# Patient Record
Sex: FEMALE | Race: WHITE | Employment: OTHER | URBAN - METROPOLITAN AREA
[De-identification: names, ages, dates, MRNs, and addresses within clinical notes are randomized per-mention and may not be internally consistent; named-entity substitution may affect disease eponyms.]

---

## 2021-03-03 DIAGNOSIS — Z23 NEED FOR VACCINATION: ICD-10-CM

## 2021-12-02 PROBLEM — S46.001A ROTATOR CUFF INJURY, RIGHT, INITIAL ENCOUNTER: Status: ACTIVE | Noted: 2021-12-02

## 2022-04-19 PROBLEM — E66.9 OBESITY, CLASS I, BMI 30-34.9: Status: ACTIVE | Noted: 2022-04-19

## 2022-04-19 PROBLEM — E66.811 OBESITY, CLASS I, BMI 30-34.9: Status: ACTIVE | Noted: 2022-04-19

## 2024-12-07 ENCOUNTER — APPOINTMENT (OUTPATIENT)
Dept: RADIOLOGY | Facility: MEDICAL CENTER | Age: 69
DRG: 565 | End: 2024-12-07
Attending: EMERGENCY MEDICINE
Payer: MEDICARE

## 2024-12-07 ENCOUNTER — HOSPITAL ENCOUNTER (INPATIENT)
Facility: MEDICAL CENTER | Age: 69
LOS: 6 days | DRG: 565 | End: 2024-12-13
Attending: EMERGENCY MEDICINE | Admitting: HOSPITALIST
Payer: MEDICARE

## 2024-12-07 DIAGNOSIS — R74.01 TRANSAMINITIS: ICD-10-CM

## 2024-12-07 DIAGNOSIS — E86.0 DEHYDRATION: ICD-10-CM

## 2024-12-07 DIAGNOSIS — S80.01XA CONTUSION OF RIGHT KNEE, INITIAL ENCOUNTER: ICD-10-CM

## 2024-12-07 DIAGNOSIS — I16.0 HYPERTENSIVE URGENCY: ICD-10-CM

## 2024-12-07 DIAGNOSIS — S22.42XA CLOSED FRACTURE OF MULTIPLE RIBS OF LEFT SIDE, INITIAL ENCOUNTER: ICD-10-CM

## 2024-12-07 DIAGNOSIS — T79.6XXA TRAUMATIC RHABDOMYOLYSIS, INITIAL ENCOUNTER (HCC): ICD-10-CM

## 2024-12-07 PROBLEM — R50.9 FEVER: Status: ACTIVE | Noted: 2024-12-07

## 2024-12-07 PROBLEM — Z71.89 ACP (ADVANCE CARE PLANNING): Status: ACTIVE | Noted: 2024-12-07

## 2024-12-07 PROBLEM — R74.8 LIVER ENZYME ELEVATION: Status: ACTIVE | Noted: 2024-12-07

## 2024-12-07 PROBLEM — N63.0 BREAST NODULE: Status: ACTIVE | Noted: 2024-12-07

## 2024-12-07 PROBLEM — M62.82 RHABDOMYOLYSIS: Status: ACTIVE | Noted: 2024-12-07

## 2024-12-07 PROBLEM — S22.42XD CLOSED FRACTURE OF MULTIPLE RIBS OF LEFT SIDE WITH ROUTINE HEALING: Status: ACTIVE | Noted: 2024-12-07

## 2024-12-07 PROBLEM — I25.10 CORONARY ARTERY DISEASE INVOLVING NATIVE CORONARY ARTERY OF NATIVE HEART WITHOUT ANGINA PECTORIS: Status: ACTIVE | Noted: 2024-12-07

## 2024-12-07 PROBLEM — E87.0 HYPERNATREMIA: Status: ACTIVE | Noted: 2024-12-07

## 2024-12-07 PROBLEM — R79.89 ELEVATED TROPONIN: Status: ACTIVE | Noted: 2024-12-07

## 2024-12-07 PROBLEM — I31.39 PERICARDIAL EFFUSION: Status: ACTIVE | Noted: 2024-12-07

## 2024-12-07 LAB
ALBUMIN SERPL BCP-MCNC: 4.1 G/DL (ref 3.2–4.9)
ALBUMIN/GLOB SERPL: 1.2 G/DL
ALP SERPL-CCNC: 95 U/L (ref 30–99)
ALT SERPL-CCNC: 302 U/L (ref 2–50)
ANION GAP SERPL CALC-SCNC: 16 MMOL/L (ref 7–16)
AST SERPL-CCNC: 875 U/L (ref 12–45)
BASOPHILS # BLD AUTO: 0.3 % (ref 0–1.8)
BASOPHILS # BLD: 0.04 K/UL (ref 0–0.12)
BILIRUB SERPL-MCNC: 0.9 MG/DL (ref 0.1–1.5)
BUN SERPL-MCNC: 41 MG/DL (ref 8–22)
CALCIUM ALBUM COR SERPL-MCNC: 9.4 MG/DL (ref 8.5–10.5)
CALCIUM SERPL-MCNC: 9.5 MG/DL (ref 8.4–10.2)
CHLORIDE SERPL-SCNC: 112 MMOL/L (ref 96–112)
CK SERPL-CCNC: ABNORMAL U/L (ref 0–154)
CO2 SERPL-SCNC: 20 MMOL/L (ref 20–33)
CREAT SERPL-MCNC: 0.88 MG/DL (ref 0.5–1.4)
EKG IMPRESSION: NORMAL
EOSINOPHIL # BLD AUTO: 0.02 K/UL (ref 0–0.51)
EOSINOPHIL NFR BLD: 0.2 % (ref 0–6.9)
ERYTHROCYTE [DISTWIDTH] IN BLOOD BY AUTOMATED COUNT: 47.9 FL (ref 35.9–50)
FLUAV RNA SPEC QL NAA+PROBE: NEGATIVE
FLUBV RNA SPEC QL NAA+PROBE: NEGATIVE
GFR SERPLBLD CREATININE-BSD FMLA CKD-EPI: 70 ML/MIN/1.73 M 2
GLOBULIN SER CALC-MCNC: 3.4 G/DL (ref 1.9–3.5)
GLUCOSE SERPL-MCNC: 105 MG/DL (ref 65–99)
HCT VFR BLD AUTO: 41.8 % (ref 37–47)
HGB BLD-MCNC: 13.2 G/DL (ref 12–16)
IMM GRANULOCYTES # BLD AUTO: 0.06 K/UL (ref 0–0.11)
IMM GRANULOCYTES NFR BLD AUTO: 0.5 % (ref 0–0.9)
LACTATE SERPL-SCNC: 1.3 MMOL/L (ref 0.5–2)
LIPASE SERPL-CCNC: 41 U/L (ref 11–82)
LYMPHOCYTES # BLD AUTO: 1.08 K/UL (ref 1–4.8)
LYMPHOCYTES NFR BLD: 8.8 % (ref 22–41)
MCH RBC QN AUTO: 25.6 PG (ref 27–33)
MCHC RBC AUTO-ENTMCNC: 31.6 G/DL (ref 32.2–35.5)
MCV RBC AUTO: 81 FL (ref 81.4–97.8)
MONOCYTES # BLD AUTO: 0.73 K/UL (ref 0–0.85)
MONOCYTES NFR BLD AUTO: 6 % (ref 0–13.4)
NEUTROPHILS # BLD AUTO: 10.3 K/UL (ref 1.82–7.42)
NEUTROPHILS NFR BLD: 84.2 % (ref 44–72)
NRBC # BLD AUTO: 0 K/UL
NRBC BLD-RTO: 0 /100 WBC (ref 0–0.2)
PLATELET # BLD AUTO: 388 K/UL (ref 164–446)
PMV BLD AUTO: 10.8 FL (ref 9–12.9)
POTASSIUM SERPL-SCNC: 4.3 MMOL/L (ref 3.6–5.5)
PROCALCITONIN SERPL-MCNC: 0.24 NG/ML
PROT SERPL-MCNC: 7.5 G/DL (ref 6–8.2)
RBC # BLD AUTO: 5.16 M/UL (ref 4.2–5.4)
RSV RNA SPEC QL NAA+PROBE: NEGATIVE
SARS-COV-2 RNA RESP QL NAA+PROBE: NOTDETECTED
SODIUM SERPL-SCNC: 148 MMOL/L (ref 135–145)
SPECIMEN SOURCE: NORMAL
TROPONIN T SERPL-MCNC: 61 NG/L (ref 6–19)
WBC # BLD AUTO: 12.2 K/UL (ref 4.8–10.8)

## 2024-12-07 PROCEDURE — 700117 HCHG RX CONTRAST REV CODE 255: Performed by: EMERGENCY MEDICINE

## 2024-12-07 PROCEDURE — 93005 ELECTROCARDIOGRAM TRACING: CPT | Mod: TC | Performed by: EMERGENCY MEDICINE

## 2024-12-07 PROCEDURE — 700105 HCHG RX REV CODE 258: Performed by: EMERGENCY MEDICINE

## 2024-12-07 PROCEDURE — 770020 HCHG ROOM/CARE - TELE (206)

## 2024-12-07 PROCEDURE — 87040 BLOOD CULTURE FOR BACTERIA: CPT

## 2024-12-07 PROCEDURE — 84484 ASSAY OF TROPONIN QUANT: CPT

## 2024-12-07 PROCEDURE — 83605 ASSAY OF LACTIC ACID: CPT

## 2024-12-07 PROCEDURE — 72125 CT NECK SPINE W/O DYE: CPT

## 2024-12-07 PROCEDURE — 84145 PROCALCITONIN (PCT): CPT

## 2024-12-07 PROCEDURE — 83690 ASSAY OF LIPASE: CPT

## 2024-12-07 PROCEDURE — 700105 HCHG RX REV CODE 258: Performed by: HOSPITALIST

## 2024-12-07 PROCEDURE — 71260 CT THORAX DX C+: CPT

## 2024-12-07 PROCEDURE — 700102 HCHG RX REV CODE 250 W/ 637 OVERRIDE(OP): Performed by: EMERGENCY MEDICINE

## 2024-12-07 PROCEDURE — 99497 ADVNCD CARE PLAN 30 MIN: CPT | Performed by: HOSPITALIST

## 2024-12-07 PROCEDURE — 73562 X-RAY EXAM OF KNEE 3: CPT | Mod: RT

## 2024-12-07 PROCEDURE — 71045 X-RAY EXAM CHEST 1 VIEW: CPT

## 2024-12-07 PROCEDURE — 0241U HCHG SARS-COV-2 COVID-19 NFCT DS RESP RNA 4 TRGT MIC: CPT

## 2024-12-07 PROCEDURE — 72128 CT CHEST SPINE W/O DYE: CPT

## 2024-12-07 PROCEDURE — 99285 EMERGENCY DEPT VISIT HI MDM: CPT

## 2024-12-07 PROCEDURE — 36415 COLL VENOUS BLD VENIPUNCTURE: CPT

## 2024-12-07 PROCEDURE — 85025 COMPLETE CBC W/AUTO DIFF WBC: CPT

## 2024-12-07 PROCEDURE — 72131 CT LUMBAR SPINE W/O DYE: CPT

## 2024-12-07 PROCEDURE — 99223 1ST HOSP IP/OBS HIGH 75: CPT | Mod: 25,AI | Performed by: HOSPITALIST

## 2024-12-07 PROCEDURE — 80053 COMPREHEN METABOLIC PANEL: CPT

## 2024-12-07 PROCEDURE — 70450 CT HEAD/BRAIN W/O DYE: CPT

## 2024-12-07 PROCEDURE — 82550 ASSAY OF CK (CPK): CPT

## 2024-12-07 PROCEDURE — A9270 NON-COVERED ITEM OR SERVICE: HCPCS | Performed by: EMERGENCY MEDICINE

## 2024-12-07 RX ORDER — ACETAMINOPHEN 325 MG/1
650 TABLET ORAL EVERY 6 HOURS PRN
Status: DISCONTINUED | OUTPATIENT
Start: 2024-12-07 | End: 2024-12-13 | Stop reason: HOSPADM

## 2024-12-07 RX ORDER — SODIUM CHLORIDE, SODIUM LACTATE, POTASSIUM CHLORIDE, CALCIUM CHLORIDE 600; 310; 30; 20 MG/100ML; MG/100ML; MG/100ML; MG/100ML
INJECTION, SOLUTION INTRAVENOUS CONTINUOUS
Status: ACTIVE | OUTPATIENT
Start: 2024-12-07 | End: 2024-12-08

## 2024-12-07 RX ORDER — SODIUM CHLORIDE, SODIUM LACTATE, POTASSIUM CHLORIDE, CALCIUM CHLORIDE 600; 310; 30; 20 MG/100ML; MG/100ML; MG/100ML; MG/100ML
INJECTION, SOLUTION INTRAVENOUS CONTINUOUS
Status: DISCONTINUED | OUTPATIENT
Start: 2024-12-08 | End: 2024-12-10

## 2024-12-07 RX ORDER — SODIUM CHLORIDE, SODIUM LACTATE, POTASSIUM CHLORIDE, CALCIUM CHLORIDE 600; 310; 30; 20 MG/100ML; MG/100ML; MG/100ML; MG/100ML
1000 INJECTION, SOLUTION INTRAVENOUS ONCE
Status: COMPLETED | OUTPATIENT
Start: 2024-12-07 | End: 2024-12-07

## 2024-12-07 RX ORDER — ACETAMINOPHEN 325 MG/1
650 TABLET ORAL ONCE
Status: COMPLETED | OUTPATIENT
Start: 2024-12-07 | End: 2024-12-07

## 2024-12-07 RX ORDER — SODIUM CHLORIDE 9 MG/ML
30 INJECTION, SOLUTION INTRAVENOUS
Status: DISCONTINUED | OUTPATIENT
Start: 2024-12-07 | End: 2024-12-13 | Stop reason: HOSPADM

## 2024-12-07 RX ORDER — HYDROMORPHONE HYDROCHLORIDE 1 MG/ML
0.25 INJECTION, SOLUTION INTRAMUSCULAR; INTRAVENOUS; SUBCUTANEOUS
Status: DISCONTINUED | OUTPATIENT
Start: 2024-12-07 | End: 2024-12-13 | Stop reason: HOSPADM

## 2024-12-07 RX ORDER — AMOXICILLIN 250 MG
2 CAPSULE ORAL EVERY EVENING
Status: DISCONTINUED | OUTPATIENT
Start: 2024-12-07 | End: 2024-12-13 | Stop reason: HOSPADM

## 2024-12-07 RX ORDER — ONDANSETRON 2 MG/ML
4 INJECTION INTRAMUSCULAR; INTRAVENOUS EVERY 4 HOURS PRN
Status: DISCONTINUED | OUTPATIENT
Start: 2024-12-07 | End: 2024-12-13 | Stop reason: HOSPADM

## 2024-12-07 RX ORDER — IBUPROFEN 400 MG/1
400 TABLET, FILM COATED ORAL 2 TIMES DAILY
Status: ON HOLD | COMMUNITY
Start: 2024-11-29 | End: 2024-12-13

## 2024-12-07 RX ORDER — ACETAMINOPHEN 325 MG/1
650 TABLET ORAL ONCE
Status: DISCONTINUED | OUTPATIENT
Start: 2024-12-07 | End: 2024-12-07 | Stop reason: CLARIF

## 2024-12-07 RX ORDER — OXYCODONE HYDROCHLORIDE 5 MG/1
2.5 TABLET ORAL
Status: DISCONTINUED | OUTPATIENT
Start: 2024-12-07 | End: 2024-12-13 | Stop reason: HOSPADM

## 2024-12-07 RX ORDER — ONDANSETRON 4 MG/1
4 TABLET, ORALLY DISINTEGRATING ORAL EVERY 4 HOURS PRN
Status: DISCONTINUED | OUTPATIENT
Start: 2024-12-07 | End: 2024-12-13 | Stop reason: HOSPADM

## 2024-12-07 RX ORDER — OXYCODONE HYDROCHLORIDE 5 MG/1
5 TABLET ORAL
Status: DISCONTINUED | OUTPATIENT
Start: 2024-12-07 | End: 2024-12-13 | Stop reason: HOSPADM

## 2024-12-07 RX ORDER — HYDRALAZINE HYDROCHLORIDE 20 MG/ML
10 INJECTION INTRAMUSCULAR; INTRAVENOUS EVERY 4 HOURS PRN
Status: DISCONTINUED | OUTPATIENT
Start: 2024-12-07 | End: 2024-12-11

## 2024-12-07 RX ORDER — POLYETHYLENE GLYCOL 3350 17 G/17G
1 POWDER, FOR SOLUTION ORAL
Status: DISCONTINUED | OUTPATIENT
Start: 2024-12-07 | End: 2024-12-13 | Stop reason: HOSPADM

## 2024-12-07 RX ORDER — ENOXAPARIN SODIUM 100 MG/ML
40 INJECTION SUBCUTANEOUS DAILY
Status: DISCONTINUED | OUTPATIENT
Start: 2024-12-08 | End: 2024-12-13 | Stop reason: HOSPADM

## 2024-12-07 RX ADMIN — SODIUM CHLORIDE, POTASSIUM CHLORIDE, SODIUM LACTATE AND CALCIUM CHLORIDE 2000 ML: 600; 310; 30; 20 INJECTION, SOLUTION INTRAVENOUS at 19:48

## 2024-12-07 RX ADMIN — IOHEXOL 100 ML: 350 INJECTION, SOLUTION INTRAVENOUS at 17:26

## 2024-12-07 RX ADMIN — SODIUM CHLORIDE, POTASSIUM CHLORIDE, SODIUM LACTATE AND CALCIUM CHLORIDE 1000 ML: 600; 310; 30; 20 INJECTION, SOLUTION INTRAVENOUS at 16:30

## 2024-12-07 RX ADMIN — ACETAMINOPHEN 650 MG: 325 TABLET ORAL at 17:48

## 2024-12-07 SDOH — ECONOMIC STABILITY: TRANSPORTATION INSECURITY
IN THE PAST 12 MONTHS, HAS THE LACK OF TRANSPORTATION KEPT YOU FROM MEDICAL APPOINTMENTS OR FROM GETTING MEDICATIONS?: NO

## 2024-12-07 SDOH — ECONOMIC STABILITY: TRANSPORTATION INSECURITY
IN THE PAST 12 MONTHS, HAS LACK OF RELIABLE TRANSPORTATION KEPT YOU FROM MEDICAL APPOINTMENTS, MEETINGS, WORK OR FROM GETTING THINGS NEEDED FOR DAILY LIVING?: NO

## 2024-12-07 ASSESSMENT — FIBROSIS 4 INDEX
FIB4 SCORE: 9.08
FIB4 SCORE: 9.08

## 2024-12-07 ASSESSMENT — ENCOUNTER SYMPTOMS
STRIDOR: 0
VOMITING: 0
EYE REDNESS: 0
EYE DISCHARGE: 0
BRUISES/BLEEDS EASILY: 0
FEVER: 0
BACK PAIN: 1
SHORTNESS OF BREATH: 0
COUGH: 0
NERVOUS/ANXIOUS: 0
MYALGIAS: 1
FOCAL WEAKNESS: 0
ABDOMINAL PAIN: 0
CHILLS: 0
FLANK PAIN: 0

## 2024-12-07 ASSESSMENT — LIFESTYLE VARIABLES
TOTAL SCORE: 0
DOES PATIENT WANT TO STOP DRINKING: NO
TOTAL SCORE: 0
ON A TYPICAL DAY WHEN YOU DRINK ALCOHOL HOW MANY DRINKS DO YOU HAVE: 1
ALCOHOL_USE: YES
TOTAL SCORE: 0
CONSUMPTION TOTAL: NEGATIVE
HOW MANY TIMES IN THE PAST YEAR HAVE YOU HAD 5 OR MORE DRINKS IN A DAY: 0
EVER HAD A DRINK FIRST THING IN THE MORNING TO STEADY YOUR NERVES TO GET RID OF A HANGOVER: NO
HAVE PEOPLE ANNOYED YOU BY CRITICIZING YOUR DRINKING: NO
HAVE YOU EVER FELT YOU SHOULD CUT DOWN ON YOUR DRINKING: NO
AVERAGE NUMBER OF DAYS PER WEEK YOU HAVE A DRINK CONTAINING ALCOHOL: 0
EVER FELT BAD OR GUILTY ABOUT YOUR DRINKING: NO

## 2024-12-07 ASSESSMENT — PAIN DESCRIPTION - PAIN TYPE: TYPE: ACUTE PAIN

## 2024-12-07 ASSESSMENT — COGNITIVE AND FUNCTIONAL STATUS - GENERAL
HELP NEEDED FOR BATHING: A LITTLE
CLIMB 3 TO 5 STEPS WITH RAILING: A LOT
DRESSING REGULAR LOWER BODY CLOTHING: A LOT
MOVING FROM LYING ON BACK TO SITTING ON SIDE OF FLAT BED: A LOT
SUGGESTED CMS G CODE MODIFIER MOBILITY: CL
MOVING TO AND FROM BED TO CHAIR: A LOT
SUGGESTED CMS G CODE MODIFIER DAILY ACTIVITY: CK
MOBILITY SCORE: 13
TOILETING: A LITTLE
DAILY ACTIVITIY SCORE: 19
DRESSING REGULAR UPPER BODY CLOTHING: A LITTLE
WALKING IN HOSPITAL ROOM: A LOT
STANDING UP FROM CHAIR USING ARMS: A LOT
TURNING FROM BACK TO SIDE WHILE IN FLAT BAD: A LITTLE

## 2024-12-07 ASSESSMENT — SOCIAL DETERMINANTS OF HEALTH (SDOH)
WITHIN THE LAST YEAR, HAVE YOU BEEN HUMILIATED OR EMOTIONALLY ABUSED IN OTHER WAYS BY YOUR PARTNER OR EX-PARTNER?: NO
WITHIN THE LAST YEAR, HAVE YOU BEEN KICKED, HIT, SLAPPED, OR OTHERWISE PHYSICALLY HURT BY YOUR PARTNER OR EX-PARTNER?: NO
WITHIN THE LAST YEAR, HAVE YOU BEEN AFRAID OF YOUR PARTNER OR EX-PARTNER?: NO
WITHIN THE PAST 12 MONTHS, THE FOOD YOU BOUGHT JUST DIDN'T LAST AND YOU DIDN'T HAVE MONEY TO GET MORE: NEVER TRUE
WITHIN THE LAST YEAR, HAVE TO BEEN RAPED OR FORCED TO HAVE ANY KIND OF SEXUAL ACTIVITY BY YOUR PARTNER OR EX-PARTNER?: NO
WITHIN THE PAST 12 MONTHS, YOU WORRIED THAT YOUR FOOD WOULD RUN OUT BEFORE YOU GOT THE MONEY TO BUY MORE: NEVER TRUE
IN THE PAST 12 MONTHS, HAS THE ELECTRIC, GAS, OIL, OR WATER COMPANY THREATENED TO SHUT OFF SERVICE IN YOUR HOME?: NO

## 2024-12-07 ASSESSMENT — PATIENT HEALTH QUESTIONNAIRE - PHQ9
1. LITTLE INTEREST OR PLEASURE IN DOING THINGS: NOT AT ALL
2. FEELING DOWN, DEPRESSED, IRRITABLE, OR HOPELESS: NOT AT ALL
SUM OF ALL RESPONSES TO PHQ9 QUESTIONS 1 AND 2: 0

## 2024-12-07 NOTE — DISCHARGE PLANNING
Success!  The APS Web Intake information was saved successfully.    Please keep this reference number for your records: [id number]    APS report file by this CM to APS based on what was relayed by RN per EMS.   Detail Level: Detailed Otc Regimen: Bug spray everytime outside Discontinue Regimen: Bactrim \\nAcyclovir Continue Regimen: Finish tapering dose of prednisone 10 mg tablet Take 4 tablets all together by mouth x 3 days, 3 tablets x 3 days, 2 tablets x 3 days, then 1 tablet x 3 days\\n\\nclobetasol 0.05 % topical ointment Apply a thin layer layer to irritation on body twice daily until clear, no longer than 4 weeks ( avoid face and groin) Modify Regimen: Xyzal 5 mg tablet Take 2 tablets by mouth at bedtime as needed for itching\\n\\nAllegra Allergy 180 mg tablet Take 2 tablets by mouth in the morning

## 2024-12-07 NOTE — ED TRIAGE NOTES
"Chief Complaint   Patient presents with    GLF     Pt states that something hit her on the back of her head and she fell down onto her right knee and was unable to get up, pt states this occurred on Wednesday   Per EMS report pts home is in unlivable condition this items everywhere making it difficult for them to get her out of home  Pt states the only pain she has at the moment is in her left rib cage as she felt a pop when she was attempting to get up yesterday       BP (!) 164/81   Pulse 86   Temp (!) 38.1 °C (100.6 °F) (Temporal)   Resp 20   Ht 1.6 m (5' 3\")   Wt 88.9 kg (196 lb)   SpO2 97%   BMI 34.72 kg/m²       Due to the condition of the pt and the report from EMS about the condition of the pts home case management was contacted.       "

## 2024-12-07 NOTE — ED NOTES
Pharmacy Medication Reconciliation      ~Medication reconciliation updated and complete per patient at bedside   ~Allergies have been verified and updated   ~No oral ABX within the last 30 days  ~Patient home pharmacy :  [facility]      ~Anticoagulants (rivaroxaban, apixaban, edoxaban, dabigatran, warfarin, enoxaparin) taken in the last 14 days? No

## 2024-12-07 NOTE — DISCHARGE PLANNING
note:  PAUL Nicholson notified CM that per EMS, pt's apartment is worse than a hoarder's place. APS needs to be filed by EMS because they have first hand information about pt's home condition. Natalia at [facility] notified and she sent a message to EMS personnel who picked up pt from home .     Discussed with CM sup and if pt is A/O x3 , we need to follow what pt wants to do. CM will complete assessment.

## 2024-12-07 NOTE — ED NOTES
Wounds     Right hand     Left Elbow    Left Knee     Right Knee     Left ankle     Left lower back     Sacrum       Noted bruises to right upper back and left ribs.

## 2024-12-07 NOTE — ED PROVIDER NOTES
"ED Provider Note    CHIEF COMPLAINT  Chief Complaint   Patient presents with    GLF     Pt states that something hit her on the back of her head and she fell down onto her right knee and was unable to get up, pt states this occurred on Wednesday   Per EMS report pts home is in unlivable condition this items everywhere making it difficult for them to get her out of home  Pt states the only pain she has at the moment is in her left rib cage as she felt a pop when she was attempting to get up yesterday         EXTERNAL RECORDS REVIEWED  Outpatient Notes patient was seen at the [facility] on September 23, 2022 for a rotator cuff tear.    HPI/ROS  LIMITATION TO HISTORY   Select: : None  OUTSIDE HISTORIAN(S):  EMS reported the patient's home is unlivable.    Luis Felipe Hernandez is a 69 y.o. female who presents to the ER with complaint of right knee pain and left rib pain after something fell on her head while she was trying to arrange something in her home 2 days ago.  The head strike caused her to fall to the ground although she did not get knocked out.  When she fell to the ground, the patient says she was unable to get up because she was \"in a tight space.\"  The paramedics report that the patient's home is unlivable.  She has items everywhere in the home making it very difficult for the paramedics to even extract her from the home.  The patient states that she feels a pop in her left rib cage when she attempts to move.  No trouble breathing.  Patient denies headache.  No nausea or vomiting.  She says she was unable to get up so she had to urinate on herself although she has not been eating or drinking for the last couple days because she has been unable to get up and therefore has not been urinating much over the last 24 hours or so.  No diarrhea.  She denies any recent cough, cold or flulike symptoms.  Patient does present with a low-grade fever here today but was unaware that she had a fever.  No pain with " "urination.  No abdominal pain.  Patient stays with respect to her knee pain, she is able to bend and extend her knee.  She says it was much more swollen 2 days ago after she fell.  No hip pain.  No neck pain or back pain.  Patient does not take any blood thinners.  She says she has a dog at home.  A friend is going to be going over to take care of her dogs/feed her dog.    PAST MEDICAL HISTORY       SURGICAL HISTORY   has a past surgical history that includes hysterectomy robotic (2019); other orthopedic surgery; shldr arthroscop,surg,w/rotat cuff repr (Right, 4/19/2022); shldr arthroscop,part acromioplas (Right, 4/19/2022); shldr arthroscop part debride 1-2 (Right, 4/19/2022); and repair biceps long tendon (Right, 4/19/2022).    FAMILY HISTORY  No family history on file.    SOCIAL HISTORY  Social History     Tobacco Use    Smoking status: Never    Smokeless tobacco: Never   Vaping Use    Vaping status: Not on file   Substance and Sexual Activity    Alcohol use: Not on file     Comment: occasional    Drug use: Not on file     Comment: CBD oil    Sexual activity: Not on file       CURRENT MEDICATIONS  Home Medications       Reviewed by Torsten Anand (Pharmacy Tech) on 12/07/24 at 1537  Med List Status: Complete     Medication Last Dose Status   ibuprofen (MOTRIN) 400 MG Tab 12/4/2024 Active   Non Formulary Request 12/4/2024 Active                  Audit from Redirected Encounters    **Home medications have not yet been reviewed for this encounter**         ALLERGIES  Allergies   Allergen Reactions    Amoxicillin Unspecified     Yeast infection       PHYSICAL EXAM  VITAL SIGNS: BP (!) 146/70   Pulse 69   Temp 36.8 °C (98.3 °F) (Oral)   Resp 18   Ht 1.6 m (5' 3\")   Wt 84.7 kg (186 lb 11.7 oz)   SpO2 94%   BMI 33.08 kg/m²    Constitutional:  Well developed, well nourished; No acute distress.  Disheveled.  Unkempt.  HENT: Normocephalic, Atraumatic, Bilateral external ears normal, very dry mucous " membranes.  Eyes: PERRL, EOMI, Conjunctiva normal, No discharge.   Neck: Normal range of motion, supple, nontender midline C-spine without step-off or deformity.   Lymphatic: No lymphadenopathy noted.   Cardiovascular: Normal heart rate, Normal rhythm, No murmurs, rubs or gallops   Thorax & Lungs: CTA=bilaterally;  No respiratory distress,  No wheezing rales, or rhonchi; there is some bruising over the left lower posterior lateral ribs with tenderness in that area.  No crepitus or subQ air  Abdomen: soft, good bowel sounds, no guarding no rebound, no masses, no pulsatile mass, no tenderness, no distention  Skin: Warm, Dry, No erythema, No rash.   Back: No tenderness to the midline T-spine or L-spine.  No step-off or deformity.  There is bruising over the right flank., No CVA tenderness.   Extremities: 2+ dp and pt pulses bilateral LEs;  Nontender; no pretibial edema.  Multiple areas of bruising and contusion, particularly over the medial aspects of the bilateral knees which appear to be pressure like contusions from lying on the ground for long period of time.  There is also a bruise over the dorsum of the right hand without any bony tenderness.  Full range of motion to the patient's hand.  neurologic: Alert & oriented x 4, clear speech,   Psychiatric: appropriate, normal affect     EKG/LABS  Results for orders placed or performed during the hospital encounter of 12/07/24   Lactic Acid    Collection Time: 12/07/24  3:06 PM   Result Value Ref Range    Lactic Acid 1.3 0.5 - 2.0 mmol/L   CBC with Differential    Collection Time: 12/07/24  3:06 PM   Result Value Ref Range    WBC 12.2 (H) 4.8 - 10.8 K/uL    RBC 5.16 4.20 - 5.40 M/uL    Hemoglobin 13.2 12.0 - 16.0 g/dL    Hematocrit 41.8 37.0 - 47.0 %    MCV 81.0 (L) 81.4 - 97.8 fL    MCH 25.6 (L) 27.0 - 33.0 pg    MCHC 31.6 (L) 32.2 - 35.5 g/dL    RDW 47.9 35.9 - 50.0 fL    Platelet Count 388 164 - 446 K/uL    MPV 10.8 9.0 - 12.9 fL    Neutrophils-Polys 84.20 (H) 44.00 -  72.00 %    Lymphocytes 8.80 (L) 22.00 - 41.00 %    Monocytes 6.00 0.00 - 13.40 %    Eosinophils 0.20 0.00 - 6.90 %    Basophils 0.30 0.00 - 1.80 %    Immature Granulocytes 0.50 0.00 - 0.90 %    Nucleated RBC 0.00 0.00 - 0.20 /100 WBC    Neutrophils (Absolute) 10.30 (H) 1.82 - 7.42 K/uL    Lymphs (Absolute) 1.08 1.00 - 4.80 K/uL    Monos (Absolute) 0.73 0.00 - 0.85 K/uL    Eos (Absolute) 0.02 0.00 - 0.51 K/uL    Baso (Absolute) 0.04 0.00 - 0.12 K/uL    Immature Granulocytes (abs) 0.06 0.00 - 0.11 K/uL    NRBC (Absolute) 0.00 K/uL   Complete Metabolic Panel    Collection Time: 12/07/24  3:06 PM   Result Value Ref Range    Sodium 148 (H) 135 - 145 mmol/L    Potassium 4.3 3.6 - 5.5 mmol/L    Chloride 112 96 - 112 mmol/L    Co2 20 20 - 33 mmol/L    Anion Gap 16.0 7.0 - 16.0    Glucose 105 (H) 65 - 99 mg/dL    Bun 41 (H) 8 - 22 mg/dL    Creatinine 0.88 0.50 - 1.40 mg/dL    Calcium 9.5 8.4 - 10.2 mg/dL    Correct Calcium 9.4 8.5 - 10.5 mg/dL    AST(SGOT) 875 (H) 12 - 45 U/L    ALT(SGPT) 302 (H) 2 - 50 U/L    Alkaline Phosphatase 95 30 - 99 U/L    Total Bilirubin 0.9 0.1 - 1.5 mg/dL    Albumin 4.1 3.2 - 4.9 g/dL    Total Protein 7.5 6.0 - 8.2 g/dL    Globulin 3.4 1.9 - 3.5 g/dL    A-G Ratio 1.2 g/dL   CREATINE KINASE    Collection Time: 12/07/24  3:06 PM   Result Value Ref Range    CPK Total >93369 (HH) 0 - 154 U/L   LIPASE    Collection Time: 12/07/24  3:06 PM   Result Value Ref Range    Lipase 41 11 - 82 U/L   TROPONIN    Collection Time: 12/07/24  3:06 PM   Result Value Ref Range    Troponin T 61 (H) 6 - 19 ng/L   ESTIMATED GFR    Collection Time: 12/07/24  3:06 PM   Result Value Ref Range    GFR (CKD-EPI) 70 >60 mL/min/1.73 m 2   PROCALCITONIN    Collection Time: 12/07/24  3:06 PM   Result Value Ref Range    Procalcitonin 0.24 <0.25 ng/mL   Blood Culture - Draw one from central line and one from peripheral site    Collection Time: 12/07/24  3:45 PM    Specimen: Peripheral; Blood   Result Value Ref Range    Significant  Indicator NEG     Source BLD     Site PERIPHERAL     Culture Result       No Growth  Note: Blood cultures are incubated for 5 days and  are monitored continuously.Positive blood cultures  are called to the RN and reported as soon as  they are identified.     CoV-2, FLU A/B, and RSV by PCR (2-4 Hours CEPHEID) : Collect NP swab in VTM    Collection Time: 24  3:45 PM    Specimen: Respirate   Result Value Ref Range    Influenza virus A RNA Negative Negative    Influenza virus B, PCR Negative Negative    RSV, PCR Negative Negative    SARS-CoV-2 by PCR NotDetected     SARS-CoV-2 Source Nasal Swab    EKG (NOW)    Collection Time: 24  3:51 PM   Result Value Ref Range    Report       [facility] Emergency [facility]    Test Date:  2024  Pt Name:    MARK FRAZIER                 Department: [facility]  MRN:        [id number]                      [facility]  Gender:     Female                       Technician: [id number]  :        1955                   Requested By:REBEKAH HERNANDEZ  Order #:    [id number]                    Reading MD: Rebekah Hernandez    Measurements  Intervals                                Axis  Rate:       77                           P:          21  CT:         147                          QRS:        30  QRSD:       75                           T:          5  QT:         385  QTc:        436    Interpretive Statements  Sinus rhythm rate 77  Normal axis  Normal intervals  No ST elevation or depression  No previous ECG available for comparison  Electronically Signed On 2024 18:14:34 PST by Rebekah Hernandez        I have independently interpreted this EKG:  Please see my EKG interpretation  above    RADIOLOGY/PROCEDURES   I have independently interpreted the diagnostic imaging associated with this visit and am waiting the final reading from the radiologist.     My preliminary interpretation is as follows: ER MD is reviewed the patient's chest x-ray.  No obvious  "infiltrate.    Radiologist interpretation:  DX-KNEE 3 VIEWS RIGHT   Final Result      No radiographic evidence of acute traumatic injury.      CT-TSPINE W/O PLUS RECONS   Final Result      Multilevel degenerative disease and DISH without a definite acute fracture.      CT-CSPINE WITHOUT PLUS RECONS   Final Result      No acute fracture or dislocation cervical spine.      Multilevel disc and facet degeneration and mild degenerative subluxations.      CT-CHEST,ABDOMEN,PELVIS WITH   Final Result         1. Fractures of the left posterior 10th and 11th ribs.   2. Small soft tissue nodule in the right superior breast.   3. Atherosclerosis including coronary artery disease.   4. Pericardial effusion.   5. Hiatal hernia.   6. Diverticulosis.      CT-LSPINE W/O PLUS RECONS   Final Result      Advanced multilevel degenerative disease without a definite acute fracture.      CT-HEAD W/O   Final Result      There is no definite acute intracranial abnormality.               DX-CHEST-PORTABLE (1 VIEW)   Final Result      No definite acute cardiac or pulmonary abnormalities are identified.      EC-ECHOCARDIOGRAM COMPLETE W/O CONT    (Results Pending)       COURSE & MEDICAL DECISION MAKING    ASSESSMENT, COURSE AND PLAN  Care Narrative: Patient presents to the ER by EMS after being on the ground for 2 days after a fall at her home.  EMS says her home was unlivable.  Apparently the house is so full of stuff that they were unable to even extract the patient from the house easily due to patient's hoarding behavior.  Patient says that she was \"rearranging things\" in her house when something fell and struck her in the head.  She is unsure what that thing was.  However, it caused her to fall to the ground.  No LOC.  When she got onto the ground she was unable to get up because she was in such a small space.  Initially she thought her knee was injured but reports that the swelling is actually gotten quite a bit better in the last day or " so.  She does not have any swelling to her right knee and she is able to flex and extend the right knee without any difficulty.  Her extensor mechanism is intact.  X-ray is negative.  No obvious fracture on film.  There is no shortening or rotation of her legs.  No pain with range of motion of either hip.  No concern for hip fracture.  She has bruising over the left ribs where she complains of rib pain.  There is  palpation tenderness in this area.  She also has some bruising over her right flank.  CT scan of the head, C-spine, T-spine, L-spine, chest/abdomen/pelvis were performed and patient was found to have rib fractures #10 and 11 on the left.  No pneumothorax.  CT scan also identified a small pericardial effusion.  Otherwise no other traumatic injuries.  Patient's laboratory evaluation reveals a total CK of over 22,000 suggestive of rhabdomyolysis.  Patient's renal function is normal.  No evidence of renal failure at this time.  She has a transaminitis.  I suspect this may be related to her rhabdomyolysis.  CT scan does not show any obvious liver pathology other than some fatty infiltration of the liver.  She has no tenderness in the right upper quadrant no complaint of abdominal pain.  Low suspicion for gallbladder pathology at this time.  Patient is extremely dry clinically.  She has dry mucous membranes.  She says she has not had anything to eat or drink in the last 2 days due to being on the floor.  Troponin came back a little bit high at 61.  EKG is nonacute.  There is no ST elevation or depression.  She has no chest pain.  At this time no concern for STEMI or non-STEMI.  Certainly trending her troponins would be beneficial is doing an echocardiogram since she was found to have a small pericardial effusion on her CT scan.  I spoke with Dr. Barros, trauma surgeon on-call down at [facility].  He is reviewed the patient's images.  Since the patient fell 2 days ago and  does not have any hypoxia or  pneumothorax, he feels patient can be managed out here at [facility] for her rib fractures.  She does not need transfer to the trauma service.  I spoke with the hospitalist on-call and he will kindly evaluate the patient hospitalization.    Hydration: Based on the patient's presentation of Other rhabdomyolysis the patient was given IV fluids. IV Hydration was used because oral hydration was not as rapid as required. Upon recheck following hydration, the patient was improved.      1850: Discussed with Dr. Carbajal, hospitalist on-call here at [facility].  He would like me to talk to trauma surgery at [facility] to be sure that they are fine keeping patient here at [facility].  Patient fell 2 days ago.  She is not hypoxic.  She is on room air.  She has 2 rib fractures without pneumothorax.  Will talk to Dr. Barros, trauma surgeon on-call at [facility] to see if he has any opposition to patient staying here at [facility] with her rib fractures.    1855: Paged trauma surgery    1930: discussed with Dr. Barros.  He has reviewed the patient's CT images.  He thinks patient is fine to stay here at [facility].  He recommends Tylenol and lidocaine patches for her rib fracture.    1940: Discussed with Dr. Carbajal, hospitalist on-call.  He will kindly evaluate the patient hospitalization.    ADDITIONAL PROBLEMS MANAGED  Problem #1: Right knee pain-improved  Problem #2: Left rib pain   Problem #3: Inability to get up off of the floor      DISPOSITION AND DISCUSSIONS  I have discussed management of the patient with the following physicians and IVANNA's:   Trauma surgeon and hospitalist    Discussion of management with other \[facility] [place] [facility]\"" or appropriate source(s): None     Escalation of care considered, and ultimately not performed: No complaint of hip pain.  No pain with range of motion of either hip.  No need for x-rays of the hips.    Barriers to care at this time, including but not limited to:  None known .      Decision tools and prescription drugs considered including, but not limited to: No evidence of any infection.  No need for antibiotics at this time.Antibiotics   .    FINAL DIAGNOSIS  1. Traumatic rhabdomyolysis, initial encounter ([facility]) Acute   2. Closed fracture of multiple ribs of left side, initial encounter Acute   3. Contusion of right knee, initial encounter Acute   4. Dehydration Acute   5. Transaminitis Acute        This dictation has been created using voice recognition software. The accuracy of the dictation is limited by the abilities of the software. I expect there may be some errors of grammar and possibly content. I made every attempt to manually correct the errors within my dictation. However, errors related to voice recognition software may still exist and should be interpreted within the appropriate context.     Electronically signed by: Sendy Hernandez M.D., 12/7/2024 3:10 PM

## 2024-12-07 NOTE — ED NOTES
"Pt provided with bed bath as she was covered in dirt despite being found down in her home. Pt reports that she fell in the [address] and that she knocked over three plans while \"rolling around\" in an attempt to get up. Pts pants were noted to have an odor of urine but no stool was present. Pts call light is in reach and brother is now bedside.   "

## 2024-12-08 ENCOUNTER — APPOINTMENT (OUTPATIENT)
Dept: CARDIOLOGY | Facility: MEDICAL CENTER | Age: 69
DRG: 565 | End: 2024-12-08
Attending: HOSPITALIST
Payer: MEDICARE

## 2024-12-08 LAB
ALBUMIN SERPL BCP-MCNC: 3.3 G/DL (ref 3.2–4.9)
ALBUMIN/GLOB SERPL: 1.1 G/DL
ALP SERPL-CCNC: 73 U/L (ref 30–99)
ALT SERPL-CCNC: 232 U/L (ref 2–50)
ANION GAP SERPL CALC-SCNC: 11 MMOL/L (ref 7–16)
AST SERPL-CCNC: 529 U/L (ref 12–45)
BILIRUB SERPL-MCNC: 0.5 MG/DL (ref 0.1–1.5)
BUN SERPL-MCNC: 40 MG/DL (ref 8–22)
CALCIUM ALBUM COR SERPL-MCNC: 9.2 MG/DL (ref 8.5–10.5)
CALCIUM SERPL-MCNC: 8.6 MG/DL (ref 8.4–10.2)
CHLORIDE SERPL-SCNC: 111 MMOL/L (ref 96–112)
CK SERPL-CCNC: ABNORMAL U/L (ref 0–154)
CO2 SERPL-SCNC: 21 MMOL/L (ref 20–33)
CREAT SERPL-MCNC: 0.91 MG/DL (ref 0.5–1.4)
ERYTHROCYTE [DISTWIDTH] IN BLOOD BY AUTOMATED COUNT: 48.4 FL (ref 35.9–50)
GFR SERPLBLD CREATININE-BSD FMLA CKD-EPI: 68 ML/MIN/1.73 M 2
GLOBULIN SER CALC-MCNC: 3 G/DL (ref 1.9–3.5)
GLUCOSE SERPL-MCNC: 125 MG/DL (ref 65–99)
HCT VFR BLD AUTO: 33.1 % (ref 37–47)
HGB BLD-MCNC: 10.5 G/DL (ref 12–16)
LACTATE SERPL-SCNC: 1 MMOL/L (ref 0.5–2)
LACTATE SERPL-SCNC: 1 MMOL/L (ref 0.5–2)
LV EJECT FRACT MOD 2C 99903: 55.5
LV EJECT FRACT MOD 4C 99902: 62.03
LV EJECT FRACT MOD BP 99901: 59.91
MAGNESIUM SERPL-MCNC: 2.2 MG/DL (ref 1.5–2.5)
MCH RBC QN AUTO: 25.7 PG (ref 27–33)
MCHC RBC AUTO-ENTMCNC: 31.7 G/DL (ref 32.2–35.5)
MCV RBC AUTO: 80.9 FL (ref 81.4–97.8)
PLATELET # BLD AUTO: 312 K/UL (ref 164–446)
PMV BLD AUTO: 11.2 FL (ref 9–12.9)
POTASSIUM SERPL-SCNC: 3.6 MMOL/L (ref 3.6–5.5)
PROT SERPL-MCNC: 6.3 G/DL (ref 6–8.2)
RBC # BLD AUTO: 4.09 M/UL (ref 4.2–5.4)
SODIUM SERPL-SCNC: 143 MMOL/L (ref 135–145)
WBC # BLD AUTO: 10.1 K/UL (ref 4.8–10.8)

## 2024-12-08 PROCEDURE — 87040 BLOOD CULTURE FOR BACTERIA: CPT

## 2024-12-08 PROCEDURE — 700105 HCHG RX REV CODE 258: Performed by: HOSPITALIST

## 2024-12-08 PROCEDURE — 770020 HCHG ROOM/CARE - TELE (206)

## 2024-12-08 PROCEDURE — 700105 HCHG RX REV CODE 258: Performed by: INTERNAL MEDICINE

## 2024-12-08 PROCEDURE — 36415 COLL VENOUS BLD VENIPUNCTURE: CPT

## 2024-12-08 PROCEDURE — A9270 NON-COVERED ITEM OR SERVICE: HCPCS | Performed by: HOSPITALIST

## 2024-12-08 PROCEDURE — 85027 COMPLETE CBC AUTOMATED: CPT

## 2024-12-08 PROCEDURE — 93306 TTE W/DOPPLER COMPLETE: CPT | Mod: 26 | Performed by: INTERNAL MEDICINE

## 2024-12-08 PROCEDURE — A9270 NON-COVERED ITEM OR SERVICE: HCPCS | Performed by: INTERNAL MEDICINE

## 2024-12-08 PROCEDURE — 99233 SBSQ HOSP IP/OBS HIGH 50: CPT | Performed by: INTERNAL MEDICINE

## 2024-12-08 PROCEDURE — 83735 ASSAY OF MAGNESIUM: CPT

## 2024-12-08 PROCEDURE — 83605 ASSAY OF LACTIC ACID: CPT

## 2024-12-08 PROCEDURE — 51798 US URINE CAPACITY MEASURE: CPT

## 2024-12-08 PROCEDURE — 700111 HCHG RX REV CODE 636 W/ 250 OVERRIDE (IP): Mod: JZ | Performed by: HOSPITALIST

## 2024-12-08 PROCEDURE — 93306 TTE W/DOPPLER COMPLETE: CPT

## 2024-12-08 PROCEDURE — 700102 HCHG RX REV CODE 250 W/ 637 OVERRIDE(OP): Performed by: HOSPITALIST

## 2024-12-08 PROCEDURE — 700102 HCHG RX REV CODE 250 W/ 637 OVERRIDE(OP): Performed by: INTERNAL MEDICINE

## 2024-12-08 PROCEDURE — 94760 N-INVAS EAR/PLS OXIMETRY 1: CPT

## 2024-12-08 PROCEDURE — 82550 ASSAY OF CK (CPK): CPT

## 2024-12-08 PROCEDURE — 80053 COMPREHEN METABOLIC PANEL: CPT

## 2024-12-08 RX ORDER — AMLODIPINE BESYLATE 5 MG/1
2.5 TABLET ORAL
Status: DISCONTINUED | OUTPATIENT
Start: 2024-12-08 | End: 2024-12-13 | Stop reason: HOSPADM

## 2024-12-08 RX ORDER — TRAMADOL HYDROCHLORIDE 50 MG/1
50 TABLET ORAL EVERY 6 HOURS PRN
Status: DISCONTINUED | OUTPATIENT
Start: 2024-12-08 | End: 2024-12-08

## 2024-12-08 RX ORDER — IBUPROFEN 400 MG/1
400 TABLET, FILM COATED ORAL EVERY 6 HOURS PRN
Status: DISCONTINUED | OUTPATIENT
Start: 2024-12-08 | End: 2024-12-13 | Stop reason: HOSPADM

## 2024-12-08 RX ORDER — TRAMADOL HYDROCHLORIDE 50 MG/1
50 TABLET ORAL EVERY 6 HOURS
Status: DISCONTINUED | OUTPATIENT
Start: 2024-12-08 | End: 2024-12-10

## 2024-12-08 RX ADMIN — IBUPROFEN 400 MG: 400 TABLET, FILM COATED ORAL at 20:26

## 2024-12-08 RX ADMIN — TRAMADOL HYDROCHLORIDE 50 MG: 50 TABLET ORAL at 11:31

## 2024-12-08 RX ADMIN — IBUPROFEN 400 MG: 400 TABLET, FILM COATED ORAL at 14:11

## 2024-12-08 RX ADMIN — SODIUM CHLORIDE, POTASSIUM CHLORIDE, SODIUM LACTATE AND CALCIUM CHLORIDE: 600; 310; 30; 20 INJECTION, SOLUTION INTRAVENOUS at 16:23

## 2024-12-08 RX ADMIN — ACETAMINOPHEN 650 MG: 325 TABLET ORAL at 00:49

## 2024-12-08 RX ADMIN — OMEPRAZOLE 20 MG: 20 CAPSULE, DELAYED RELEASE ORAL at 14:11

## 2024-12-08 RX ADMIN — SODIUM CHLORIDE, POTASSIUM CHLORIDE, SODIUM LACTATE AND CALCIUM CHLORIDE: 600; 310; 30; 20 INJECTION, SOLUTION INTRAVENOUS at 01:25

## 2024-12-08 RX ADMIN — ENOXAPARIN SODIUM 40 MG: 100 INJECTION SUBCUTANEOUS at 17:40

## 2024-12-08 RX ADMIN — AMLODIPINE BESYLATE 2.5 MG: 5 TABLET ORAL at 14:11

## 2024-12-08 ASSESSMENT — ENCOUNTER SYMPTOMS
PSYCHIATRIC NEGATIVE: 1
MYALGIAS: 1
NEUROLOGICAL NEGATIVE: 1
RESPIRATORY NEGATIVE: 1
EYES NEGATIVE: 1
GASTROINTESTINAL NEGATIVE: 1
CARDIOVASCULAR NEGATIVE: 1

## 2024-12-08 ASSESSMENT — SOCIAL DETERMINANTS OF HEALTH (SDOH)
WITHIN THE PAST 12 MONTHS, YOU WORRIED THAT YOUR FOOD WOULD RUN OUT BEFORE YOU GOT THE MONEY TO BUY MORE: NEVER TRUE
IN THE PAST 12 MONTHS, HAS THE ELECTRIC, GAS, OIL, OR WATER COMPANY THREATENED TO SHUT OFF SERVICE IN YOUR HOME?: NO
WITHIN THE PAST 12 MONTHS, THE FOOD YOU BOUGHT JUST DIDN'T LAST AND YOU DIDN'T HAVE MONEY TO GET MORE: NEVER TRUE

## 2024-12-08 ASSESSMENT — PAIN DESCRIPTION - PAIN TYPE
TYPE: ACUTE PAIN
TYPE: ACUTE PAIN;CHRONIC PAIN
TYPE: ACUTE PAIN

## 2024-12-08 NOTE — ASSESSMENT & PLAN NOTE
Pericardial effusion was seen on CT chest, abdomen, pelvis. Clinically stable.  Echocardiogram from 12/9/2024 shows trivial pericardial effusion. Monitor

## 2024-12-08 NOTE — PROGRESS NOTES
Hospital Medicine Daily Progress Note    Date of Service  12/8/2024    Chief Complaint  Luis Felipe Hernandez is a 69 y.o. female admitted 12/7/2024 after being down for about 3  days.  She states she was her hallway on 12/4/2024 trying to reach something and was hit in the back of her head by some object.  She fell on the floor and was unable to completely get up.  She denies losing consciousness but states she must of been confused.  Her brother came to her house on 12/6/2024, he did not come in as she was unable to open the door, and she was able to answer his questions through the front door.  Due to unable to reach the patient, the patient's brother called 911 on 12/7/2024.     Hospital Course  On admission, patient was febrile (101), blood pressure was elevated (209/75), WBCs were 12.2 K, lactic acid was normal x 3. CPK was > 60299. CT head and CXR were unremarkable. CT spine showed multilevel degenerative disc disease with no definite fractures. Right knee x-ray showed no evidence of acute traumatic injury.  CT chest, abdomen, pelvis showed fractures of the left posterior 10th and 11th ribs, and pericardial effusion.  Troponin was 61, EKG showed no acute ischemic findings.  Echocardiogram ordered.    COVID, influenza A&B, RSV PCR were negative. Procalcitonin was normal.  AST was 875, ALT was 302, alk phos and total bilirubin were normal.    Interval Problem Update  Afebrile, blood pressure is 160/70.  Le[place] [address]. Tramadol added for pain.  AST improved to 529, ALT improved to 232    I have discussed this patient's plan of care and discharge plan at IDT rounds today with Case Management, Nursing, Nursing leadership, and other members of the IDT team.    Consultants/Specialty  None    Code Status  Full Code    Disposition  The patient is not medically cleared for discharge to home or a post-acute facility.  Anticipate discharge to: home with close outpatient follow-up    I have placed the  appropriate orders for post-discharge needs.    Review of Systems  Review of Systems   Constitutional:  Positive for malaise/fatigue.   HENT: Negative.     Eyes: Negative.    Respiratory: Negative.     Cardiovascular: Negative.    Gastrointestinal: Negative.    Genitourinary: Negative.    Musculoskeletal:  Positive for joint pain and myalgias.   Skin: Negative.    Neurological: Negative.    Endo/Heme/Allergies: Negative.    Psychiatric/Behavioral: Negative.          Physical Exam  Temp:  [36.4 °C (97.6 °F)-38.2 °C (100.8 °F)] 37 °C (98.6 °F)  Pulse:  [65-88] 72  Resp:  [15-20] 18  BP: (121-209)/(60-81) 147/69  SpO2:  [93 %-98 %] 95 %    Physical Exam  Constitutional:       Appearance: She is ill-appearing.   HENT:      Head: Normocephalic.      Mouth/Throat:      Mouth: Mucous membranes are moist.   Cardiovascular:      Rate and Rhythm: Normal rate.   Pulmonary:      Effort: Pulmonary effort is normal.   Abdominal:      Palpations: Abdomen is soft.   Musculoskeletal:      Cervical back: Normal range of motion.      Right lower leg: No edema.      Left lower leg: No edema.   Skin:     General: Skin is warm.      Findings: Bruising present.   Neurological:      General: No focal deficit present.      Mental Status: She is alert.   Psychiatric:         Mood and Affect: Mood normal.         Fluids    Intake/Output Summary (Last 24 hours) at 12/8/2024 1230  Last data filed at 12/8/2024 0800  Gross per 24 hour   Intake 3444.74 ml   Output --   Net 3444.74 ml        Laboratory  Recent Labs     12/07/24  1506 12/08/24  0249   WBC 12.2* 10.1   RBC 5.16 4.09*   HEMOGLOBIN 13.2 10.5*   HEMATOCRIT 41.8 33.1*   MCV 81.0* 80.9*   MCH 25.6* 25.7*   MCHC 31.6* 31.7*   RDW 47.9 48.4   PLATELETCT 388 312   MPV 10.8 11.2     Recent Labs     12/07/24  1506 12/08/24  0249   SODIUM 148* 143   POTASSIUM 4.3 3.6   CHLORIDE 112 111   CO2 20 21   GLUCOSE 105* 125*   BUN 41* 40*   CREATININE 0.88 0.91   CALCIUM 9.5 8.6                    Imaging  EC-ECHOCARDIOGRAM COMPLETE W/O CONT   Final Result      DX-KNEE 3 VIEWS RIGHT   Final Result      No radiographic evidence of acute traumatic injury.      CT-TSPINE W/O PLUS RECONS   Final Result      Multilevel degenerative disease and DISH without a definite acute fracture.      CT-CSPINE WITHOUT PLUS RECONS   Final Result      No acute fracture or dislocation cervical spine.      Multilevel disc and facet degeneration and mild degenerative subluxations.      CT-CHEST,ABDOMEN,PELVIS WITH   Final Result         1. Fractures of the left posterior 10th and 11th ribs.   2. Small soft tissue nodule in the right superior breast.   3. Atherosclerosis including coronary artery disease.   4. Pericardial effusion.   5. Hiatal hernia.   6. Diverticulosis.      CT-LSPINE W/O PLUS RECONS   Final Result      Advanced multilevel degenerative disease without a definite acute fracture.      CT-HEAD W/O   Final Result      There is no definite acute intracranial abnormality.               DX-CHEST-PORTABLE (1 VIEW)   Final Result      No definite acute cardiac or pulmonary abnormalities are identified.           Assessment/Plan  * Traumatic rhabdomyolysis (HCC)- (present on admission)  Assessment & Plan  Patient was lying on the floor for almost 72 hours with very little movement.  Continue IV fluids.  Creatinine is normal.  CPK improved from > 57469 to 92826.  Monitor closely    Closed fracture of multiple ribs of left side with routine healing- (present on admission)  Assessment & Plan  CT chest, abdomen, pelvis showed fractures of the left posterior 10th and 11th ribs.  Continue multimodal pain management    Hypertensive urgency- (present on admission)  Assessment & Plan  Not on blood pressure medications at home, will start Norvasc with as needed hydralazine.    Pericardial effusion- (present on admission)  Assessment & Plan  Clinically stable.  Echocardiogram ordered.    Fever- (present on admission)  Assessment &  Plan  Likely reactive from rhabdomyolysis. COVID, influenza A&B, RSV PCR were negative.  Leukocytosis resolved. Procalcitonin was normal    Coronary artery disease involving native coronary artery of native heart without angina pectoris- (present on admission)  Assessment & Plan  Patient had extensive calcification noted on CT. troponin is mildly elevated, no ischemic changes seen on EKG.  Will monitor closely recommend outpatient follow-up    Liver enzyme elevation- (present on admission)  Assessment & Plan  Likely secondary to rhabdomyolysis.  On admission,  AST was 875, ALT was 302, alk phos and total bilirubin were normal. AST improved to 529, ALT improved to 232.  Monitor closely    Elevated troponin- (present on admission)  Assessment & Plan  EKG showed no acute ischemic findings.  Echocardiogram ordered.  Patient denied chest pain.  Monitor    Dehydration- (present on admission)  Assessment & Plan  Patient was lying on the floor for greater than 72 hours, she states she only had access to distilled water.  Continue hydration    Hypernatremia- (present on admission)  Assessment & Plan  Resolved.  Monitor    Breast nodule- (present on admission)  Assessment & Plan  CT showed small soft tissue nodule in the right superior breast.  Findings were discussed with the patient, patient was recommended to follow-up with outpatient PCP for further imaging and possible biopsy.    ACP (advance care planning)- (present on admission)  Assessment & Plan  Full code         VTE prophylaxis: Lovenox

## 2024-12-08 NOTE — DISCHARGE PLANNING
Met with pt who is A/Ox4. Pt said she lives alone and her brother lives around the corner from her. She has a best friend that lives in [place]. Pt said normally she is independent with ADLs and IADLs. She does not use any DMEs and no Home O2. She has no HH services.     We discussed discharge planning and the report by EMS that her house is cluttered and unsafe. Pt disagrees. Pt said her bedroom is fine. She agrees that it is a mess inside in the living room area but her bestfriend and her family are helping her declutter her house. Her end goal is to return home and she does not have any safety concerns.     Possible need to go to SNF due to RN stating that pt has trouble ambulating. Pt will need PT/OT eval. And follow their recommendations.     No PCP .   Pharmacy is [facility] on [address].   She said family can assist with transportation upon dc. Brother Reji # [phone].      Care Transition Team Assessment    Information Source  Orientation Level: Oriented X4  Information Given By: Patient  Who is responsible for making decisions for patient? : Patient    Readmission Evaluation  Is this a readmission?: No    Elopement Risk  Legal Hold: No  Ambulatory or Self Mobile in Wheelchair: No-Not an Elopement Risk    Interdisciplinary Discharge Planning  Does Admitting Nurse Feel This Could be a Complex Discharge?: No  Primary Care Physician: no PCP  Lives with - Patient's Self Care Capacity: Alone and Able to Care For Self  Support Systems: Family Member(s), Friends / Neighbors  Housing / Facility: [address]  Do You Take your Prescribed Medications Regularly: Yes  Able to Return to Previous ADL's: Future Time w/Therapy  Mobility Issues: No  Prior Services: Home-Independent  Patient Prefers to be Discharged to:: Home  Assistance Needed: No    Discharge Preparedness  What is your plan after discharge?: Skilled nursing facility  What are your discharge supports?: Sibling, Other (comment)  Prior Functional Level:  Ambulatory, Drives Self, Independent with Activities of Daily Living, Independent with Medication Management  Difficulity with ADLs: Walking, Toileting, Dressing, Bathing  Difficulity with IADLs: Cooking, Driving, Laundry, Shopping    Functional Assesment  Prior Functional Level: Ambulatory, Drives Self, Independent with Activities of Daily Living, Independent with Medication Management    Finances  Financial Barriers to Discharge: No  Prescription Coverage: Yes    Vision / Hearing Impairment  Vision Impairment : No  Hearing Impairment : No    Values / Beliefs / Concerns  Values / Beliefs Concerns : No    Advance Directive  Advance Directive?: None    Domestic Abuse  Have you ever been the victim of abuse or violence?: No         Discharge Risks or Barriers  Discharge risks or barriers?: Post-acute placement / services  Patient risk factors: Cognitive / sensory / physical deficit    Anticipated Discharge Information  Discharge Disposition: D/T to SNF with Medicare cert in anticipation of skilled care (03)

## 2024-12-08 NOTE — CARE PLAN
The patient is Stable - Low risk of patient condition declining or worsening    Shift Goals  Clinical Goals: pain <4/10, monitor labs, IV fluids  Patient Goals: Get better and go home  Family Goals: MISBAH    Progress made toward(s) clinical / shift goals:    Problem: Knowledge Deficit - Standard  Goal: Patient and family/care givers will demonstrate understanding of plan of care, disease process/condition, diagnostic tests and medications  Outcome: Progressing     Problem: Hemodynamics  Goal: Patient's hemodynamics, fluid balance and neurologic status will be stable or improve  Outcome: Progressing     Problem: Fluid Volume  Goal: Fluid volume balance will be maintained  Outcome: Progressing     Problem: Respiratory  Goal: Patient will achieve/maintain optimum respiratory ventilation and gas exchange  Outcome: Progressing     Problem: Fall Risk  Goal: Patient will remain free from falls  Outcome: Progressing       Patient is not progressing towards the following goals:

## 2024-12-08 NOTE — ASSESSMENT & PLAN NOTE
Not on blood pressure medications at home, will start Norvasc with as needed hydralazine.  Pressure has improved.  Monitor closely and adjust medications as needed

## 2024-12-08 NOTE — ASSESSMENT & PLAN NOTE
CT chest, abdomen, pelvis showed fractures of the left posterior 10th and 11th ribs.  Patient states that she can only tolerate ibuprofen for pain.  Omeprazole was added for stomach protection

## 2024-12-08 NOTE — PROGRESS NOTES
4 Eyes Skin Assessment Completed by PAUL Ferreira and PAUL Ko.    Head WDL  Ears WDL  Nose WDL  Mouth WDL  Neck WDL  Breast/Chest Redness under bilateral breasts  Shoulder Blades bruise left shoulder blade  Spine WDL  (R) Arm/Elbow/Hand Redness, Blanching, and Bruising  (L) Arm/Elbow/Hand Redness, Bruising, Abrasion, and Scab  Abdomen Bruising  Groin Redness  Scrotum/Coccyx/Buttocks Excoriation, skin tear left lower back  (R) Leg Scab, Bruising, and Abrasion  (L) Leg Scab, Bruising, and Abrasion  (R) Heel/Foot/Toe Redness and Blanching  (L) Heel/Foot/Toe Redness and Blanching, open sore bottom of foot          Devices In Places Tele Box      Interventions In Place Heel Mepilex, Sacral Mepilex, Pillows, and Heels Loaded W/Pillows    Possible Skin Injury Yes    Pictures Uploaded Into Epic Yes  Wound Consult Placed Yes  RN Wound Prevention Protocol Ordered Yes

## 2024-12-08 NOTE — ASSESSMENT & PLAN NOTE
Patient was lying on the floor for almost 72 hours with very little movement.  Continue IV fluids.  Creatinine is normal.  CPK improved from > 54696 to 7612.  Monitor closely    12/12: CPK down to 4100, discontinue IV fluids and monitor CPK if no increase in the next 24 hours okay to transition to skilled nursing

## 2024-12-08 NOTE — DISCHARGE PLANNING
note:  Discussed possible need for SNF or acute rehab. Pt said she prefer to go home. She said that she prefers home because she has a physical therapist and a chiroprator that she sees regular. Pt denies having difficulty ambulating. Explained that we will have PT/OT do an evaluation when she is admitted upstairs. Pt is agreeable.

## 2024-12-08 NOTE — ASSESSMENT & PLAN NOTE
Patient was lying on the floor for greater than 72 hours, she states she only had access to distilled water.  Continue hydration

## 2024-12-08 NOTE — H&P
Hospital Medicine History & Physical Note    Date of Service  12/7/2024    Primary Care Physician  Pcp Pt States None    Consultants  Emergency physician discussed with trauma on-call, Dr Barros     Code Status  Full Code    Chief Complaint  Chief Complaint   Patient presents with    GLF     Pt states that something hit her on the back of her head and she fell down onto her right knee and was unable to get up, pt states this occurred on Wednesday   Per EMS report pts home is in unlivable condition this items everywhere making it difficult for them to get her out of home  Pt states the only pain she has at the moment is in her left rib cage as she felt a pop when she was attempting to get up yesterday       History of Presenting Illness  Luis Felipe Hernandez is a 69 y.o. female with no significant past medical history other than class I obesity with a BMI of 34 who presented 12/7/2024 with multiple joint pains including right knee, right hand in addition to left-sided chest wall pain.  Symptoms occurred after she fell on the ground after presenting fell on her head while arranging goods in her house.  Patient patient did not passed out and was stuck in a tight space for 2 days without access to water or food.  She noticed that her urine output has decreased.  She does not have diarrhea, cough or headache.  In the emergency room she was found to have a low-grade fever with a temperature of 100.8     I discussed the plan of care with emergency physician, and the patient    Review of Systems  Review of Systems   Constitutional:  Positive for malaise/fatigue. Negative for chills and fever.   Eyes:  Negative for discharge and redness.   Respiratory:  Negative for cough, shortness of breath and stridor.    Cardiovascular:  Negative for leg swelling.        Chest wall pain   Gastrointestinal:  Negative for abdominal pain and vomiting.   Genitourinary:  Negative for flank pain.   Musculoskeletal:  Positive for back pain and  myalgias.   Skin: Negative.    Neurological:  Negative for focal weakness.   Endo/Heme/Allergies:  Does not bruise/bleed easily.   Psychiatric/Behavioral:  The patient is not nervous/anxious.      Past Medical History   has no past medical history on file.    Surgical History   has a past surgical history that includes hysterectomy robotic (2019); other orthopedic surgery; pr shldr arthroscop,surg,w/rotat cuff repr (Right, 4/19/2022); pr shldr arthroscop,part acromioplas (Right, 4/19/2022); pr shldr arthroscop part debride 1-2 (Right, 4/19/2022); and pr repair biceps long tendon (Right, 4/19/2022).     Family History  family history is not on file.      Social History   reports that she has never smoked. She has never used smokeless tobacco.    Allergies  Allergies   Allergen Reactions    Amoxicillin Unspecified     Yeast infection     Medications  Prior to Admission Medications   Prescriptions Last Dose Informant Patient Reported? Taking?   Non Formulary Request 12/4/2024 Patient Yes Yes   Sig: Apply 1 Application topically 2 times a day as needed (pain). Freedom Oil   ibuprofen (MOTRIN) 400 MG Tab 12/4/2024 Patient Yes Yes   Sig: Take 400 mg by mouth 2 times a day.      Facility-Administered Medications: None     Physical Exam  Temp:  [37.8 °C (100 °F)-38.2 °C (100.8 °F)] 37.8 °C (100 °F)  Pulse:  [71-88] 72  Resp:  [15-20] 20  BP: (130-209)/(60-81) 144/63  SpO2:  [93 %-97 %] 94 %  Blood Pressure : (!) 209/75   Temperature: 37.8 °C (100 °F)   Pulse: 72   Respiration: 15   Pulse Oximetry: 97 %     Physical Exam  Constitutional:       General: She is not in acute distress.  HENT:      Head: Normocephalic and atraumatic.      Right Ear: External ear normal.      Left Ear: External ear normal.      Nose: No congestion or rhinorrhea.      Mouth/Throat:      Mouth: Mucous membranes are moist.      Pharynx: No oropharyngeal exudate or posterior oropharyngeal erythema.   Eyes:      General: No scleral icterus.         "Right eye: No discharge.         Left eye: No discharge.      Conjunctiva/sclera: Conjunctivae normal.      Pupils: Pupils are equal, round, and reactive to light.   Cardiovascular:      Rate and Rhythm: Tachycardia present.      Heart sounds:      No friction rub. No gallop.   Pulmonary:      Effort: Pulmonary effort is normal.      Comments: Saturating well on room air.  Is able to speak in full sentences  Abdominal:      General: Abdomen is flat. There is no distension.      Tenderness: There is no guarding.   Musculoskeletal:         General: No swelling.      Cervical back: Neck supple. No rigidity. No muscular tenderness.      Right lower leg: No edema.      Left lower leg: No edema.   Skin:     Capillary Refill: Capillary refill takes 2 to 3 seconds.      Coloration: Skin is not jaundiced or pale.      Findings: No bruising or erythema.      Comments: Bruising around both knees, right hip, & right hand   Neurological:      Mental Status: She is alert and oriented to person, place, and time.   Psychiatric:         Mood and Affect: Mood normal.         Judgment: Judgment normal.       Laboratory:  Recent Labs     12/07/24  1506   WBC 12.2*   RBC 5.16   HEMOGLOBIN 13.2   HEMATOCRIT 41.8   MCV 81.0*   MCH 25.6*   MCHC 31.6*   RDW 47.9   PLATELETCT 388   MPV 10.8     Recent Labs     12/07/24  1506   SODIUM 148*   POTASSIUM 4.3   CHLORIDE 112   CO2 20   GLUCOSE 105*   BUN 41*   CREATININE 0.88   CALCIUM 9.5     Recent Labs     12/07/24  1506   ALTSGPT 302*   ASTSGOT 875*   ALKPHOSPHAT 95   TBILIRUBIN 0.9   LIPASE 41   GLUCOSE 105*         No results for input(s): \"NTPROBNP\" in the last 72 hours.      Recent Labs     12/07/24  1506   TROPONINT 61*     Imaging:  DX-KNEE 3 VIEWS RIGHT   Final Result      No radiographic evidence of acute traumatic injury.      CT-TSPINE W/O PLUS RECONS   Final Result      Multilevel degenerative disease and DISH without a definite acute fracture.      CT-CSPINE WITHOUT PLUS RECONS "   Final Result      No acute fracture or dislocation cervical spine.      Multilevel disc and facet degeneration and mild degenerative subluxations.      CT-CHEST,ABDOMEN,PELVIS WITH   Final Result         1. Fractures of the left posterior 10th and 11th ribs.   2. Small soft tissue nodule in the right superior breast.   3. Atherosclerosis including coronary artery disease.   4. Pericardial effusion.   5. Hiatal hernia.   6. Diverticulosis.      CT-LSPINE W/O PLUS RECONS   Final Result      Advanced multilevel degenerative disease without a definite acute fracture.      CT-HEAD W/O   Final Result      There is no definite acute intracranial abnormality.               DX-CHEST-PORTABLE (1 VIEW)   Final Result      No definite acute cardiac or pulmonary abnormalities are identified.      EC-ECHOCARDIOGRAM COMPLETE W/O CONT    (Results Pending)     I personally reviewed patient EKG shows sinus rhythm with a rate of 77.  There is no ST elevation.  There is 1 mm ST depressions in lead I, 2.  There is a T wave inversion in lead III.  QTc is 436    Assessment/Plan:  Justification for Admission Status  I anticipate this patient will require at least two midnights for appropriate medical management, necessitating inpatient admission because patient has traumatic rhabdomyolysis, will require intravenous fluids, close monitoring of renal function, and urine output    Patient will need a Telemetry bed on MEDICAL service.      * Traumatic rhabdomyolysis (HCC)- (present on admission)  Assessment & Plan  Was on the floor for 2 days.  CPK > 22K   I will start intravenous fluids.  Monitor renal function and urine output.  I will order follow-up BUN and creatinine    Closed fracture of multiple ribs of left side with routine healing- (present on admission)  Assessment & Plan  Imaging show evidence for fractures of the left posterior 10th and 11th ribs.   Emergency physician discussed patient condition with trauma on-call Dr Barros,  acute distress of manage.  Multimodal pain control.  Continuous pulse oximetry.  Intravenous fluids for traumatic rhabdomyolysis     Hypertensive urgency- (present on admission)  Assessment & Plan  I will start hydralazine as needed for extreme hypertension    Breast nodule- (present on admission)  Assessment & Plan  CT imaging show evidence for a small soft tissue nodule in the right superior breast.   Finding discussed with the patient.  Recommend follow-up with outpatient/consider biopsy according to goals of care     Coronary artery disease involving native coronary artery of native heart without angina pectoris- (present on admission)  Assessment & Plan  Extensive calcification are seen on CT scan  Not currently having chest pain.  I will monitor on telemetry.  Recommend stress test as outpatient, earlier if becomes symptomatic    Liver enzyme elevation- (present on admission)  Assessment & Plan  AST and ALT elevation are likely secondary to rhabdomyolysis  I will order follow-up AST and ALT    Elevated troponin- (present on admission)  Assessment & Plan  In the indeterminate range  Denies chest pain.  EKG shows sinus rhythm with a rate of 77.  There is no ST elevation.  There is 1 mm ST depressions in lead I, 2.  There is a T wave inversion in lead III.  QTc is 436   I will place on continuous cardiac monitoring    I will trend troponin levels  Stat EKG, troponin for chest pain or shortness of breath   I will check an echocardiography     Pericardial effusion- (present on admission)  Assessment & Plan  I will check an echocardiography to evaluate for tamponade physiology    Fever- (present on admission)  Assessment & Plan  Rule out COVID-19, RSV and influenza.  I will check a procalcitonin     Dehydration- (present on admission)  Assessment & Plan  Likely secondary to reduced oral intake   Encourage oral intake as tolerated, antiemetics as needed.  Intravenous hydration until adequate oral intake is achieved.      Hypernatremia- (present on admission)  Assessment & Plan  Hypovolemic hypernatremia   I expect Na to improve with IVF     ACP (advance care planning)- (present on admission)  Assessment & Plan  I had a discussion with the patient regarding goals of care, diagnoses, prognosis, and CODE STATUS. We discussed her prognosis and comorbidities.  The patient has advanced age of 69 years.  She has relatively few comorbid conditions given her age.  She is presenting with traumatic rhabdomyolysis and severe dehydration.  At this point the patient wants to maintain a full code.  She is open to all forms of invasive or noninvasive diagnostic and therapeutic interventions.       VTE prophylaxis: SCDs/TEDs and enoxaparin ppx    I had a discussion with the patient regarding goals of care, diagnoses, prognosis, and CODE STATUS. We discussed her prognosis and comorbidities.  The patient has advanced age of 69 years.  She has relatively few comorbid conditions given her age.  She is presenting with traumatic rhabdomyolysis and severe dehydration.  At this point the patient wants to maintain a full code.  She is open to all forms of invasive or noninvasive diagnostic and therapeutic interventions. I spent 17 minutes on advanced care planning.

## 2024-12-08 NOTE — ED NOTES
Lab called with critical result of CPK 22,000 at 1600. Critical lab result read back to lab.   Dr. Hernandez notified of critical lab result at 1600.  Critical lab result read back by Dr. Hernandez.

## 2024-12-08 NOTE — ASSESSMENT & PLAN NOTE
CT showed small soft tissue nodule in the right superior breast.  Findings were discussed with the patient, patient was recommended to follow-up with outpatient PCP for further imaging and possible biopsy.

## 2024-12-08 NOTE — PROGRESS NOTES
12-hour chart check complete.    Monitor Summary  Rhythm: SR/SB  Rate: 58-73  Ectopy: r PVC/PAC  Measurements: 0.16/0.04/0.38

## 2024-12-08 NOTE — ASSESSMENT & PLAN NOTE
EKG showed no acute ischemic findings.  Echocardiogram ordered.  Patient denied chest pain.  Monitor

## 2024-12-08 NOTE — PROGRESS NOTES
Mindy from Lab called with critical result of cpk 12,592 at 0340. Critical lab result read back to Mindy. Dr. Curiel notified of critical lab result at 0342 . Critical lab result read back by Dr. Curiel     No new orders.

## 2024-12-08 NOTE — ASSESSMENT & PLAN NOTE
Likely reactive from rhabdomyolysis. COVID, influenza A&B, RSV PCR were negative.  Leukocytosis resolved. Procalcitonin was normal

## 2024-12-08 NOTE — ASSESSMENT & PLAN NOTE
Patient had extensive calcification noted on CT. Troponin is mildly elevated, no ischemic changes seen on EKG. Echocardiogram showed LVEF of about 60% with grade 2 diastolic dysfunction. Recommend outpatient follow-up

## 2024-12-09 LAB
ALBUMIN SERPL BCP-MCNC: 3.1 G/DL (ref 3.2–4.9)
ALBUMIN/GLOB SERPL: 1.1 G/DL
ALP SERPL-CCNC: 74 U/L (ref 30–99)
ALT SERPL-CCNC: 201 U/L (ref 2–50)
ANION GAP SERPL CALC-SCNC: 10 MMOL/L (ref 7–16)
AST SERPL-CCNC: 442 U/L (ref 12–45)
BILIRUB SERPL-MCNC: 0.3 MG/DL (ref 0.1–1.5)
BUN SERPL-MCNC: 36 MG/DL (ref 8–22)
CALCIUM ALBUM COR SERPL-MCNC: 9.1 MG/DL (ref 8.5–10.5)
CALCIUM SERPL-MCNC: 8.4 MG/DL (ref 8.4–10.2)
CHLORIDE SERPL-SCNC: 109 MMOL/L (ref 96–112)
CK SERPL-CCNC: ABNORMAL U/L (ref 0–154)
CO2 SERPL-SCNC: 22 MMOL/L (ref 20–33)
CREAT SERPL-MCNC: 1.01 MG/DL (ref 0.5–1.4)
ERYTHROCYTE [DISTWIDTH] IN BLOOD BY AUTOMATED COUNT: 49.1 FL (ref 35.9–50)
GFR SERPLBLD CREATININE-BSD FMLA CKD-EPI: 60 ML/MIN/1.73 M 2
GLOBULIN SER CALC-MCNC: 2.8 G/DL (ref 1.9–3.5)
GLUCOSE SERPL-MCNC: 109 MG/DL (ref 65–99)
HCT VFR BLD AUTO: 32.6 % (ref 37–47)
HGB BLD-MCNC: 10.3 G/DL (ref 12–16)
MCH RBC QN AUTO: 25.9 PG (ref 27–33)
MCHC RBC AUTO-ENTMCNC: 31.6 G/DL (ref 32.2–35.5)
MCV RBC AUTO: 81.9 FL (ref 81.4–97.8)
PLATELET # BLD AUTO: 271 K/UL (ref 164–446)
PMV BLD AUTO: 11.4 FL (ref 9–12.9)
POTASSIUM SERPL-SCNC: 3.6 MMOL/L (ref 3.6–5.5)
PROT SERPL-MCNC: 5.9 G/DL (ref 6–8.2)
RBC # BLD AUTO: 3.98 M/UL (ref 4.2–5.4)
SODIUM SERPL-SCNC: 141 MMOL/L (ref 135–145)
WBC # BLD AUTO: 8.3 K/UL (ref 4.8–10.8)

## 2024-12-09 PROCEDURE — 36415 COLL VENOUS BLD VENIPUNCTURE: CPT

## 2024-12-09 PROCEDURE — 97602 WOUND(S) CARE NON-SELECTIVE: CPT

## 2024-12-09 PROCEDURE — 94760 N-INVAS EAR/PLS OXIMETRY 1: CPT

## 2024-12-09 PROCEDURE — 82550 ASSAY OF CK (CPK): CPT

## 2024-12-09 PROCEDURE — A9270 NON-COVERED ITEM OR SERVICE: HCPCS | Performed by: INTERNAL MEDICINE

## 2024-12-09 PROCEDURE — 85027 COMPLETE CBC AUTOMATED: CPT

## 2024-12-09 PROCEDURE — 700105 HCHG RX REV CODE 258: Performed by: INTERNAL MEDICINE

## 2024-12-09 PROCEDURE — 97166 OT EVAL MOD COMPLEX 45 MIN: CPT

## 2024-12-09 PROCEDURE — 770020 HCHG ROOM/CARE - TELE (206)

## 2024-12-09 PROCEDURE — 99233 SBSQ HOSP IP/OBS HIGH 50: CPT | Performed by: INTERNAL MEDICINE

## 2024-12-09 PROCEDURE — 700102 HCHG RX REV CODE 250 W/ 637 OVERRIDE(OP): Performed by: INTERNAL MEDICINE

## 2024-12-09 PROCEDURE — 97162 PT EVAL MOD COMPLEX 30 MIN: CPT

## 2024-12-09 PROCEDURE — 80053 COMPREHEN METABOLIC PANEL: CPT

## 2024-12-09 PROCEDURE — 700111 HCHG RX REV CODE 636 W/ 250 OVERRIDE (IP): Mod: JZ | Performed by: HOSPITALIST

## 2024-12-09 RX ADMIN — SODIUM CHLORIDE, POTASSIUM CHLORIDE, SODIUM LACTATE AND CALCIUM CHLORIDE: 600; 310; 30; 20 INJECTION, SOLUTION INTRAVENOUS at 00:29

## 2024-12-09 RX ADMIN — AMLODIPINE BESYLATE 2.5 MG: 5 TABLET ORAL at 04:52

## 2024-12-09 RX ADMIN — SODIUM CHLORIDE, POTASSIUM CHLORIDE, SODIUM LACTATE AND CALCIUM CHLORIDE: 600; 310; 30; 20 INJECTION, SOLUTION INTRAVENOUS at 09:16

## 2024-12-09 RX ADMIN — IBUPROFEN 400 MG: 400 TABLET, FILM COATED ORAL at 04:09

## 2024-12-09 RX ADMIN — IBUPROFEN 400 MG: 400 TABLET, FILM COATED ORAL at 17:22

## 2024-12-09 RX ADMIN — OMEPRAZOLE 20 MG: 20 CAPSULE, DELAYED RELEASE ORAL at 04:52

## 2024-12-09 RX ADMIN — ENOXAPARIN SODIUM 40 MG: 100 INJECTION SUBCUTANEOUS at 17:22

## 2024-12-09 RX ADMIN — IBUPROFEN 400 MG: 400 TABLET, FILM COATED ORAL at 10:00

## 2024-12-09 RX ADMIN — SODIUM CHLORIDE, POTASSIUM CHLORIDE, SODIUM LACTATE AND CALCIUM CHLORIDE: 600; 310; 30; 20 INJECTION, SOLUTION INTRAVENOUS at 17:24

## 2024-12-09 ASSESSMENT — COGNITIVE AND FUNCTIONAL STATUS - GENERAL
EATING MEALS: A LITTLE
SUGGESTED CMS G CODE MODIFIER DAILY ACTIVITY: CK
MOBILITY SCORE: 10
MOVING TO AND FROM BED TO CHAIR: A LOT
CLIMB 3 TO 5 STEPS WITH RAILING: TOTAL
HELP NEEDED FOR BATHING: A LOT
DAILY ACTIVITIY SCORE: 15
DRESSING REGULAR LOWER BODY CLOTHING: A LOT
TURNING FROM BACK TO SIDE WHILE IN FLAT BAD: A LOT
DRESSING REGULAR UPPER BODY CLOTHING: A LITTLE
STANDING UP FROM CHAIR USING ARMS: A LOT
MOVING FROM LYING ON BACK TO SITTING ON SIDE OF FLAT BED: A LOT
TOILETING: A LOT
WALKING IN HOSPITAL ROOM: TOTAL
PERSONAL GROOMING: A LITTLE
SUGGESTED CMS G CODE MODIFIER MOBILITY: CL

## 2024-12-09 ASSESSMENT — ENCOUNTER SYMPTOMS
PSYCHIATRIC NEGATIVE: 1
MYALGIAS: 1
NEUROLOGICAL NEGATIVE: 1
CARDIOVASCULAR NEGATIVE: 1
RESPIRATORY NEGATIVE: 1
GASTROINTESTINAL NEGATIVE: 1
EYES NEGATIVE: 1

## 2024-12-09 ASSESSMENT — GAIT ASSESSMENTS: GAIT LEVEL OF ASSIST: UNABLE TO PARTICIPATE

## 2024-12-09 ASSESSMENT — ACTIVITIES OF DAILY LIVING (ADL): TOILETING: INDEPENDENT

## 2024-12-09 ASSESSMENT — PAIN DESCRIPTION - PAIN TYPE
TYPE: ACUTE PAIN
TYPE: ACUTE PAIN

## 2024-12-09 NOTE — CARE PLAN
The patient is Stable - Low risk of patient condition declining or worsening    Shift Goals  Clinical Goals: Keep pain below 2; ambulation; IV fluids; monitor labs and CPK  Patient Goals: feel better  Family Goals: MISBAH    Progress made toward(s) clinical / shift goals:  Continue with IV LR. Monitor labs and CPK. Encourage patient to ambulate as soon as possible. Ibuprofen for pain PRN per MAR and pain level.     Problem: Knowledge Deficit - Standard  Goal: Patient and family/care givers will demonstrate understanding of plan of care, disease process/condition, diagnostic tests and medications  Outcome: Progressing     Problem: Fall Risk  Goal: Patient will remain free from falls  Outcome: Progressing    Problem: Fluid Volume  Goal: Fluid volume balance will be maintained  Outcome: Progressing        Patient is not progressing towards the following goals:N/A

## 2024-12-09 NOTE — DIETARY
"Nutrition Services: Initial Assessment     Day of admit. Luis Felipe Hernandez is 69 y.o., female with admitting DX of Traumatic rhabdomyolysis, initial encounter ([facility]) [T79.6XXA].    Consult Received for...: [facility]    Current Hospital Problems List:    Traumatic rhabdomyolysis  Closed fracture of multiple ribs  Hypertensive urgency  Pericardial effusion  Fever  CAD  Liver enzyme elevation  Elevated troponin  Dehydration  Hypernatremia (resolved)  Breast nodule       Nutrition Assessment:      Height: 160 cm (5' 3\")  Weight: 84.7 kg (186 lb 11.7 oz)  Weight taken via: Bed Scale  BMI Calculated: 32.57  BMI Classification: Obesity Class 1     Objective:   Pertinent Meds: Norvasc, omeprazole, senna-docusate, LR infusion  Skin/Wounds:  Report of Pressure Ulcer / Injury and Skin Tear. Wound consult pending  Last BM:     12/09/24     Current Diet Order/Intake:   Regular diet PO intake % of most meals.       Subjective:   Patient reported UBW: 210 lb  Dietary Recall/Energy Intake: Per nutrition screen, Pt did not have a decreased appetite PTA. PO intake most likely was poor x 72 hours while she was down at home. This indicates Insufficient energy intake x 72 hours.        Nutrition Focused Physical Exam (NFPE)  Weight Loss: On nutrition screen, pt reports not knowing whether she has lost weight. Based on admit wt compared to UBW, Pt may have lost 23 lb (11%) prior to hospitalization.  RD Suspects this change related to no PO intake of food and fluids x 72 hours while down.  Muscle Mass: Unable to identify at this time  Subcutaneous Fat: Unable to identify at this time  Fluid Accumulation: none noted  Reduced  Strength: N/A in acute care setting.    Nutrition Diagnosis:      Based on RD assessment at this time, Patient does not meet criteria in congruence with ASPEN/Academy guidelines for malnutrition. Based on review of Epic, pt's poor PO was < 5 days. No report of poor appetite before fall. PO intake has been good during " hospitalization.     Nutrition Interventions:      1. Continue with diet as ordered.   2. Patient aware of active plan of care as appropriate.     Nutrition Monitoring and Evaluation:      Monitor nutrition POC, goal for >50% intake from meals and supplements.  Additional fluids per MD/DO  Monitor vital signs pertinent to nutrition.    RD following and will provide updated recommendations as indicated.      Geneva Cabrera R.D.                                         ASPEN/AND CRITERIA FOR MALNUTRITION

## 2024-12-09 NOTE — THERAPY
"Occupational Therapy   Initial Evaluation     Patient Name: Luis Felipe Hernandez  Age:  69 y.o., Sex:  female  Medical Record #: [id number]  Today's Date: 12/9/2024     Precautions  Precautions: Fall Risk    Assessment  Patient is a 69 y.o. female admitted 12/7/2024 after being down for about 3  days.  She states she was in her hallway on 12/4/2024 trying to reach something and was hit in the back of her head by some object. Pt with Traumatic rhabdomyolysis, Hypertensive urgency, Dehydration.  Pt reports being indep PTA, living alone with a brother nearby who is not in good physical condition.  Pt is currently limited by decreased functional mobility, activity tolerance, cognition, strength, AROM, coordination, balance, adherence to precautions, and pain which are affecting her ability to complete ADLs/IADLs at baseline. See grid for details. Pt will benefit from further inpt post acute therapy prior to home.  At this time, pt is not physically capable of caring for herself at home.  Will continue to follow.       Plan    Occupational Therapy Initial Treatment Plan   Treatment Interventions: Self Care / Activities of Daily Living, Adaptive Equipment, Neuro Re-Education / Balance, Therapeutic Exercises, Therapeutic Activity, Family / Caregiver Training  Treatment Frequency: 3 Times per Week  Duration: Until Therapy Goals Met    DC Equipment Recommendations: Unable to determine at this time  Discharge Recommendations: Recommend post-acute placement for additional occupational therapy services prior to discharge home     Subjective    \"My friends that are like family are going to help get my house ready for me to come home.\"     Objective       12/09/24 1159   Prior Living Situation   Prior Services Home-Independent   Housing / Facility 1 Story House   Steps Into Home 0   Steps In Home 0   Bathroom Set up Walk In Shower   Equipment Owned None   Lives with - Patient's Self Care Capacity Alone and Able to Care For Self   Comments " "House heavily cluttered per pt who reports \"I'm in the middle of cleaning out a bunch of junk\"   Prior Level of ADL Function   Self Feeding Independent   Grooming / Hygiene Independent   Bathing Independent   Dressing Independent   Toileting Independent   Prior Level of IADL Function   Medication Management Independent   Laundry Independent   Kitchen Mobility Independent   Finances Independent   Home Management Independent   Shopping Independent   Prior Level Of Mobility Independent Without Device in Community   Driving / Transportation Driving Independent   Occupation (Pre-Hospital Vocational) Retired Due To Age   Leisure Interests Pets   Comments Pt has a dog, fish pond, larger piece of property she takes care of herself   History of Falls   History of Falls Yes   Date of Last Fall   (approx 12/5/24)   Precautions   Precautions Fall Risk   Vitals   Pulse Oximetry 95 %   O2 (LPM) 1   O2 Delivery Device Silicone Nasal Cannula   Pain 0 - 10 Group   Location Rib Cage;Shoulder   Location Orientation Left;Right   Description Sore;Sharp   Therapist Pain Assessment During Activity;Nurse Notified   Cognition    Cognition / Consciousness X   Comments Pt is tangential with decreased insight into current limitations.  She was perseverating on how she's been doing her Shoulder PT while shes been up today and she was unable to attend to new learning about what role of therapy is in the hospital. May benefit from more detailed cogntive evaluation as she may have post concussive symptoms   Active ROM Upper Body   Active ROM Upper Body  X   Dominant Hand Right   Comments LUE limited by previous shoulder surgery as well as pain in L ribs when raising LUE   Strength Upper Body   Upper Body Strength  X   Gross Strength Generalized Weakness, Equal Bilaterally.    Sensation Upper Body   Upper Extremity Sensation  WDL   Coordination Upper Body   Coordination X   Comments GM appears impaired by weakness   Balance Assessment   Sitting " Balance (Static) Fair   Sitting Balance (Dynamic) Fair -   Standing Balance (Static) Poor   Standing Balance (Dynamic) Trace   Weight Shift Sitting Fair   Weight Shift Standing Poor   Comments with FWW in standing   Bed Mobility    Supine to Sit   (encountered seated EOB upon arrival)   Sit to Supine Moderate Assist   ADL Assessment   Lower Body Dressing Total Assist   Comments Pt used commode earlier with nursing   How much help from another person does the patient currently need...   Putting on and taking off regular lower body clothing? 2   Bathing (including washing, rinsing, and drying)? 2   Toileting, which includes using a toilet, bedpan, or urinal? 2   Putting on and taking off regular upper body clothing? 3   Taking care of personal grooming such as brushing teeth? 3   Eating meals? 3   6 Clicks Daily Activity Score 15   Functional Mobility   Sit to Stand Maximal Assist  (x2 people)   Bed, Chair, Wheelchair Transfer Refused   Toilet Transfers Unable to Participate  (BSC earlier with nursing)   Mobility sit to stand only   Visual Perception   Comments appears WFL   Edema / Skin Assessment   Comments bruising to L ribs/back noted   Activity Tolerance   Sitting Edge of Bed 10 min   Standing 20 sec   Comments limited by weakness   Short Term Goals   Short Term Goal # 1 Pt will transfer to [facility] for toileting with Nick in 4 visits   Short Term Goal # 2 Pt will sponge bathe seated with SBA in 4 visits   Short Term Goal # 3 Pt will dress LB with min A in 5 visits   Short Term Goal # 4 Pt will stand 5 min at sink to groom with Nick in 5 visits   Education Group   Education Provided Role of Occupational Therapist   Role of Occupational Therapist Patient Response Patient;Acceptance;Explanation;Reinforcement Needed

## 2024-12-09 NOTE — PROGRESS NOTES
Jhoana from Lab called with critical result of CPK [id number] at 0315. Critical lab result read back to Jhoana.   Dr. Curiel notified of critical lab result at 0318.  Critical lab result read back by Dr. Curiel.

## 2024-12-09 NOTE — CARE PLAN
The patient is Stable - Low risk of patient condition declining or worsening    Shift Goals  Clinical Goals: maintain fluids; pain control 3/10 or less  Patient Goals: rest  Family Goals: n/a    Progress made toward(s) clinical / shift goals:  pain controlled 3/10 or less; ambulate; +BM    Patient is not progressing towards the following goals:

## 2024-12-09 NOTE — PROGRESS NOTES
Telemetry Shift Summary    Rhythm SR/SB  HR Range 57-69  Ectopy rPVC  Measurements 0.20/0.08/0.40        Normal Values  Rhythm SR  HR Range    Measurements 0.12-0.20 / 0.06-0.10  / 0.30-0.52

## 2024-12-09 NOTE — THERAPY
"Physical Therapy   Initial Evaluation     Patient Name: Luis Felipe Hernandez  Age:  69 y.o., Sex:  female  Medical Record #: [id number]  Today's Date: 12/9/2024     Precautions  Precautions: Fall Risk    Assessment  Patient is 69 y.o. female with diagnosis of rhabdo s/p fall at home- pt was lying on the floor for almost 72 hours with very little movement, sustained left posterior 10th and 11th rib fractures. Presenting with impaired balance and strength, too weak to ambulate or transfer to chair safely. Overall pt reporst feeling \"very tired\" but is motivated to recover. See below for goals and POC.    Plan    Physical Therapy Initial Treatment Plan   Treatment Plan : Bed Mobility, Gait Training, Family / Caregiver Training, Neuro Re-Education / Balance, Self Care / Home Evaluation, Stair Training, Therapeutic Exercise, Therapeutic Activities  Treatment Frequency: 4 Times per Week  Duration: Until Therapy Goals Met    DC Equipment Recommendations: Unable to determine at this time  Discharge Recommendations: Recommend post-acute placement for additional physical therapy services prior to discharge home        12/09/24 1156   Prior Living Situation   Housing / Facility [address] [facility]   Steps Into Home 0   Steps In Home 0   Equipment Owned None   Lives with - Patient's Self Care Capacity Alone and Able to Care For Self   Comments Per chart pts home is full of clutter and junk- home is not suitable to be lived in   Prior Level of Functional Mobility   Bed Mobility Independent   Transfer Status Independent   Ambulation Independent   Assistive Devices Used None   Stairs Independent   History of Falls   History of Falls Yes   Cognition    Comments Pt is oriented x4 but demo's poor insight and judgement   Active ROM Lower Body    Active ROM Lower Body  WDL   Strength Lower Body   Lower Body Strength  X   Gross Strength Generalized Weakness, Equal Bilaterally   Balance Assessment   Sitting Balance (Static) Fair   Sitting Balance " (Dynamic) Fair -   Standing Balance (Static) Poor   Standing Balance (Dynamic) Trace   Weight Shift Sitting Fair   Weight Shift Standing Poor   Comments stdg with FWW   Bed Mobility    Supine to Sit   (NT, pt sitting EOB upon entry)   Sit to Supine Moderate Assist   Gait Analysis   Gait Level Of Assist Unable to Participate  (B LE chiquitaky and carmineling static stand)   Functional Mobility   Sit to Stand Maximal Assist  (x2)   Bed, Chair, Wheelchair Transfer Refused   Activity Tolerance   Standing 20 sec with FWW   Short Term Goals    Short Term Goal # 1 Pt will be able to perform bed mobility and sup <>sit Indep in 6 visits.   Short Term Goal # 2 Pt will be able to perform sit <>stand and transfer with FWW Cyrus in 6 visits.   Short Term Goal # 3 Pt will be able to ambulate 100 ft with FWW Cyrus in 6 visits.

## 2024-12-09 NOTE — PROGRESS NOTES
12-hour chart check complete.    Monitor Summary  Rhythm: SR  Rate: 70's  Ectopy: -  Measurements: .12/.06/.36

## 2024-12-10 LAB
ALBUMIN SERPL BCP-MCNC: 2.7 G/DL (ref 3.2–4.9)
ALBUMIN/GLOB SERPL: 1 G/DL
ALP SERPL-CCNC: 65 U/L (ref 30–99)
ALT SERPL-CCNC: 184 U/L (ref 2–50)
ANION GAP SERPL CALC-SCNC: 10 MMOL/L (ref 7–16)
AST SERPL-CCNC: 430 U/L (ref 12–45)
BILIRUB SERPL-MCNC: 0.3 MG/DL (ref 0.1–1.5)
BUN SERPL-MCNC: 20 MG/DL (ref 8–22)
CALCIUM ALBUM COR SERPL-MCNC: 9.2 MG/DL (ref 8.5–10.5)
CALCIUM SERPL-MCNC: 8.2 MG/DL (ref 8.4–10.2)
CHLORIDE SERPL-SCNC: 106 MMOL/L (ref 96–112)
CK SERPL-CCNC: 9447 U/L (ref 0–154)
CO2 SERPL-SCNC: 22 MMOL/L (ref 20–33)
CREAT SERPL-MCNC: 0.85 MG/DL (ref 0.5–1.4)
ERYTHROCYTE [DISTWIDTH] IN BLOOD BY AUTOMATED COUNT: 48.5 FL (ref 35.9–50)
GFR SERPLBLD CREATININE-BSD FMLA CKD-EPI: 73 ML/MIN/1.73 M 2
GLOBULIN SER CALC-MCNC: 2.6 G/DL (ref 1.9–3.5)
GLUCOSE SERPL-MCNC: 107 MG/DL (ref 65–99)
HCT VFR BLD AUTO: 32.4 % (ref 37–47)
HGB BLD-MCNC: 10.2 G/DL (ref 12–16)
MCH RBC QN AUTO: 25.8 PG (ref 27–33)
MCHC RBC AUTO-ENTMCNC: 31.5 G/DL (ref 32.2–35.5)
MCV RBC AUTO: 81.8 FL (ref 81.4–97.8)
PLATELET # BLD AUTO: 272 K/UL (ref 164–446)
PMV BLD AUTO: 11.9 FL (ref 9–12.9)
POTASSIUM SERPL-SCNC: 3.5 MMOL/L (ref 3.6–5.5)
PROT SERPL-MCNC: 5.3 G/DL (ref 6–8.2)
RBC # BLD AUTO: 3.96 M/UL (ref 4.2–5.4)
SODIUM SERPL-SCNC: 138 MMOL/L (ref 135–145)
WBC # BLD AUTO: 7.3 K/UL (ref 4.8–10.8)

## 2024-12-10 PROCEDURE — 700105 HCHG RX REV CODE 258: Performed by: INTERNAL MEDICINE

## 2024-12-10 PROCEDURE — 94760 N-INVAS EAR/PLS OXIMETRY 1: CPT

## 2024-12-10 PROCEDURE — 82550 ASSAY OF CK (CPK): CPT

## 2024-12-10 PROCEDURE — 770020 HCHG ROOM/CARE - TELE (206)

## 2024-12-10 PROCEDURE — 85027 COMPLETE CBC AUTOMATED: CPT

## 2024-12-10 PROCEDURE — A9270 NON-COVERED ITEM OR SERVICE: HCPCS | Performed by: INTERNAL MEDICINE

## 2024-12-10 PROCEDURE — 700111 HCHG RX REV CODE 636 W/ 250 OVERRIDE (IP): Mod: JZ | Performed by: HOSPITALIST

## 2024-12-10 PROCEDURE — 80053 COMPREHEN METABOLIC PANEL: CPT

## 2024-12-10 PROCEDURE — 36415 COLL VENOUS BLD VENIPUNCTURE: CPT

## 2024-12-10 PROCEDURE — 700102 HCHG RX REV CODE 250 W/ 637 OVERRIDE(OP): Performed by: INTERNAL MEDICINE

## 2024-12-10 PROCEDURE — 99233 SBSQ HOSP IP/OBS HIGH 50: CPT | Performed by: INTERNAL MEDICINE

## 2024-12-10 RX ORDER — TRAMADOL HYDROCHLORIDE 50 MG/1
25 TABLET ORAL EVERY 6 HOURS PRN
Status: DISCONTINUED | OUTPATIENT
Start: 2024-12-10 | End: 2024-12-13 | Stop reason: HOSPADM

## 2024-12-10 RX ORDER — SODIUM CHLORIDE, SODIUM LACTATE, POTASSIUM CHLORIDE, CALCIUM CHLORIDE 600; 310; 30; 20 MG/100ML; MG/100ML; MG/100ML; MG/100ML
INJECTION, SOLUTION INTRAVENOUS CONTINUOUS
Status: DISCONTINUED | OUTPATIENT
Start: 2024-12-10 | End: 2024-12-12

## 2024-12-10 RX ADMIN — ENOXAPARIN SODIUM 40 MG: 100 INJECTION SUBCUTANEOUS at 17:31

## 2024-12-10 RX ADMIN — TRAMADOL HYDROCHLORIDE 25 MG: 50 TABLET ORAL at 17:31

## 2024-12-10 RX ADMIN — IBUPROFEN 400 MG: 400 TABLET, FILM COATED ORAL at 14:12

## 2024-12-10 RX ADMIN — OMEPRAZOLE 20 MG: 20 CAPSULE, DELAYED RELEASE ORAL at 05:18

## 2024-12-10 RX ADMIN — IBUPROFEN 400 MG: 400 TABLET, FILM COATED ORAL at 00:49

## 2024-12-10 RX ADMIN — SODIUM CHLORIDE, POTASSIUM CHLORIDE, SODIUM LACTATE AND CALCIUM CHLORIDE: 600; 310; 30; 20 INJECTION, SOLUTION INTRAVENOUS at 14:19

## 2024-12-10 RX ADMIN — AMLODIPINE BESYLATE 2.5 MG: 5 TABLET ORAL at 05:18

## 2024-12-10 RX ADMIN — IBUPROFEN 400 MG: 400 TABLET, FILM COATED ORAL at 08:09

## 2024-12-10 RX ADMIN — IBUPROFEN 400 MG: 400 TABLET, FILM COATED ORAL at 21:59

## 2024-12-10 RX ADMIN — SODIUM CHLORIDE, POTASSIUM CHLORIDE, SODIUM LACTATE AND CALCIUM CHLORIDE: 600; 310; 30; 20 INJECTION, SOLUTION INTRAVENOUS at 22:49

## 2024-12-10 RX ADMIN — SODIUM CHLORIDE, POTASSIUM CHLORIDE, SODIUM LACTATE AND CALCIUM CHLORIDE: 600; 310; 30; 20 INJECTION, SOLUTION INTRAVENOUS at 00:52

## 2024-12-10 ASSESSMENT — ENCOUNTER SYMPTOMS
MYALGIAS: 0
WEAKNESS: 0
DIZZINESS: 0
PHOTOPHOBIA: 0
ABDOMINAL PAIN: 0
INSOMNIA: 0
HEARTBURN: 0
CONSTIPATION: 0
SPEECH CHANGE: 0
BLURRED VISION: 0
DEPRESSION: 0
CLAUDICATION: 0
DIARRHEA: 0
HEADACHES: 0
NERVOUS/ANXIOUS: 0
FEVER: 0
SENSORY CHANGE: 0
CHILLS: 0
VOMITING: 0
SHORTNESS OF BREATH: 0
COUGH: 0

## 2024-12-10 ASSESSMENT — PAIN SCALES - PAIN ASSESSMENT IN ADVANCED DEMENTIA (PAINAD): BREATHING: NORMAL

## 2024-12-10 ASSESSMENT — PAIN DESCRIPTION - PAIN TYPE
TYPE: ACUTE PAIN
TYPE: ACUTE PAIN

## 2024-12-10 ASSESSMENT — PATIENT HEALTH QUESTIONNAIRE - PHQ9
SUM OF ALL RESPONSES TO PHQ9 QUESTIONS 1 AND 2: 0
1. LITTLE INTEREST OR PLEASURE IN DOING THINGS: NOT AT ALL
2. FEELING DOWN, DEPRESSED, IRRITABLE, OR HOPELESS: NOT AT ALL

## 2024-12-10 ASSESSMENT — FIBROSIS 4 INDEX: FIB4 SCORE: 8.16

## 2024-12-10 NOTE — CARE PLAN
The patient is Stable - Low risk of patient condition declining or worsening    Shift Goals  Clinical Goals: IVF, Pain Management  Patient Goals: Comfort  Family Goals: n/a    Progress made toward(s) clinical / shift goals:    Problem: Knowledge Deficit - Standard  Goal: Patient and family/care givers will demonstrate understanding of plan of care, disease process/condition, diagnostic tests and medications  Outcome: Progressing     Problem: Hemodynamics  Goal: Patient's hemodynamics, fluid balance and neurologic status will be stable or improve  Outcome: Progressing     Problem: Fluid Volume  Goal: Fluid volume balance will be maintained  Outcome: Progressing     Problem: Urinary - Renal Perfusion  Goal: Ability to achieve and maintain adequate renal perfusion and functioning will improve  Outcome: Progressing       Patient is not progressing towards the following goals:

## 2024-12-10 NOTE — WOUND TEAM
[facility]  Inpatient Services  Initial Wound and Skin Care Evaluation    Admission Date: 12/7/2024     Last order of IP CONSULT TO WOUND CARE was found on 12/7/2024 from Hospital Encounter on 12/7/2024     HPI, PMH, SH: Reviewed    Past Surgical History:   Procedure Laterality Date    PB SHLDR ARTHROSCOP,SURG,W/ROTAT CUFF REPB Right 4/19/2022    Procedure: RIGHT SHOULDER ARTHROSCOPY ROTATOR CUFF REPAIR;  Surgeon: Orlando Lopez M.D.;  Location: [facility];  Service: Orthopedics    IL SHLDR ARTHROSCOP,PART ACROMIOPLAS Right 4/19/2022    Procedure: RIGHT SUBACROMIAL DECOMPRESSION;  Surgeon: Orlando Lopez M.D.;  Location: [facility];  Service: Orthopedics    IL SHLDR ARTHROSCOP PART DEBRIDE 1-2 Right 4/19/2022    Procedure: RIGHT LABRAL DEBRIDEMENT;  Surgeon: Orlando Lopez M.D.;  Location: [facility];  Service: Orthopedics    PB REPAIR BICEPS LONG TENDON Right 4/19/2022    Procedure: RIGHT POSSIBLE BICEPS TENOTOMY, RIGHT OPEN SUBPECTORAL RENODESIS, REPAIRS AS INDICATED.;  Surgeon: Orlando Lopez M.D.;  Location: [facility];  Service: Orthopedics    HYSTERECTOMY ROBOTIC  2019    OTHER ORTHOPEDIC SURGERY      ankle     Social History     Tobacco Use    Smoking status: Never    Smokeless tobacco: Never   Substance Use Topics    Alcohol use: Not on file     Comment: occasional     Chief Complaint   Patient presents with    GLF     Pt states that something hit her on the back of her head and she fell down onto her right knee and was unable to get up, pt states this occurred on Wednesday   Per EMS report pts home is in unlivable condition this items everywhere making it difficult for them to get her out of home  Pt states the only pain she has at the moment is in her left rib cage as she felt a pop when she was attempting to get up yesterday       Diagnosis: Traumatic rhabdomyolysis, initial  encounter ([facility]) [T79.6XXA]    Unit where seen by Wound Team: 1123/01     WOUND CONSULT RELATED TO:  L elbow, knees, L lateral ankle, sacrum and coccyx    WOUND TEAM PLAN OF CARE - Frequency of Follow-up:   Nursing to follow dressing orders written for wound care. Contact wound team if area fails to progress, deteriorates or with any questions/concerns if something comes up before next scheduled follow up (See below as to whether wound is following and frequency of wound follow up)   Weekly - sacrum and coccyx  Not following, consult as needed  - L elbow, knees, L lateral ankle,    WOUND HISTORY:   Per pt she had something hit her head and she was down on tile floor and had difficulty getting back up. Per chart she was down a couple of days before her brother found her.       WOUND ASSESSMENT/LDA  Wound 12/07/24 Abrasion Elbow Posterior Left (Active)   Date First Assessed/Time First Assessed: 12/07/24 2100   Present on Original Admission: Yes  Hand Hygiene Completed: Yes  Primary Wound Type: Abrasion  Location: Elbow  Wound Orientation: Posterior  Laterality: Left      Assessments 12/9/2024  7:00 PM   Wound Image     Site Assessment Pink;Red   Periwound Assessment Scabbed   Margins Defined edges   Closure Secondary intention   Drainage Amount Scant   Drainage Description Serous   Treatments Cleansed;Nonselective debridement   Wound Cleansing Normal Saline Irrigation   Periwound Protectant Not Applicable   Dressing Status Open to Air   Dressing Changed Other (Comment)   Dressing Options Offloading Dressing - Heel   Dressing Change/Treatment Frequency Every 72 hrs, and As Needed   NEXT Dressing Change/Treatment Date 12/10/24   NEXT Weekly Photo (Inpatient Only) 12/18/24   Wound Team Following Not following   Wound Length (cm) 9.5 cm   Wound Width (cm) 3 cm   Wound Depth (cm) 0.2 cm   Wound Surface Area (cm^2) 28.5 cm^2   Wound Volume (cm^3) 5.7 cm^3   Shape irregular       Wound 12/07/24 Pressure Injury Coccyx;Sacrum  POA unstageable (Active)   Date First Assessed/Time First Assessed: 12/07/24 2100   Present on Original Admission: Yes  Hand Hygiene Completed: Yes  Primary Wound Type: Pressure Injury  Location: Coccyx;Sacrum  Wound Description (Comments): POA unstageable      Assessments 12/9/2024  7:00 PM   Wound Image     Site Assessment Red;Yellow   Periwound Assessment Pink   Margins Defined edges   Closure Secondary intention   Drainage Amount Scant   Drainage Description Serosanguineous   Treatments Cleansed   Wound Cleansing Normal Saline Irrigation   Periwound Protectant Not Applicable   Dressing Status Intact   Dressing Changed Observed   Dressing Cleansing/Solutions Not Applicable   Dressing Options Offloading Dressing - Sacral   Dressing Change/Treatment Frequency Every 72 hrs, and As Needed   NEXT Dressing Change/Treatment Date 12/10/24   NEXT Weekly Photo (Inpatient Only) 12/18/24   Wound Team Following Weekly   Pressure Injury Stage Unstageable   Wound Length (cm) 5 cm   Wound Width (cm) 3.5 cm   Wound Surface Area (cm^2) 17.5 cm^2   Shape oval   Wound Odor None       Wound 12/07/24 Abrasion Knee Anterior Left (Active)   Date First Assessed/Time First Assessed: 12/07/24 2100   Present on Original Admission: Yes  Hand Hygiene Completed: Yes  Primary Wound Type: Abrasion  Location: Knee  Wound Orientation: Anterior  Laterality: Left                        Assessments 12/9/2024  7:00 PM   Site Assessment Dry;Scabbed   Periwound Assessment Intact;Pink   Margins Defined edges;Attached edges   Closure Open to air   Drainage Amount None   Treatments Cleansed;Nonselective debridement   Wound Cleansing Foam Cleanser/Washcloth   Periwound Protectant Not Applicable   Dressing Status Open to Air   Wound Team Following Not following   Non-staged Wound Description Partial thickness   Wound Length (cm) 1.5 cm   Wound Width (cm) 1.3 cm   Wound Surface Area (cm^2) 1.95 cm^2   Shape irregular       Wound 12/07/24 Abrasion Knee  Anterior Right (Active)   Date First Assessed/Time First Assessed: 12/07/24 2100   Present on Original Admission: Yes  Hand Hygiene Completed: Yes  Primary Wound Type: Abrasion  Location: Knee  Wound Orientation: Anterior  Laterality: Right      Assessments 12/9/2024  7:00 PM   Wound Image     Site Assessment Dry;Scabbed   Periwound Assessment Pink;Intact   Margins Defined edges;Attached edges   Closure Open to air   Drainage Amount None   Treatments Cleansed;Nonselective debridement   Wound Cleansing Foam Cleanser/Washcloth   Periwound Protectant Not Applicable   Dressing Status Open to Air   Wound Team Following Not following   Non-staged Wound Description Partial thickness   Wound Length (cm) 11 cm   Wound Width (cm) 6 cm   Wound Surface Area (cm^2) 66 cm^2       Wound 12/07/24 Abrasion Ankle Dorsal Left (Active)   Date First Assessed/Time First Assessed: 12/07/24 2100   Present on Original Admission: Yes  Hand Hygiene Completed: Yes  Primary Wound Type: Pressure injury  Location: Ankle  Wound Orientation: Dorsal  Laterality: Left POA unstageable       Assessments 12/9/2024  7:00 PM   Wound Image     Site Assessment Brown;Black;eschar   Periwound Assessment Dry;Intact   Margins Attached edges;Defined edges   Closure Open to air   Drainage Amount None   Treatments Nonselective debridement;Cleansed   Wound Cleansing Foam Cleanser/Washcloth   Dressing Status Open to Air   Wound Team Following Not following   Non-staged Wound Description Full thickness   Wound Length (cm) 2 cm   Wound Width (cm) 0.8 cm   Wound Surface Area (cm^2) 1.6 cm^2   Shape oval   Pulses 2+;DP        Vascular:    VERONIKA:   No results found.    Lab Values:    Lab Results   Component Value Date/Time    WBC 8.3 12/09/2024 01:42 AM    RBC 3.98 (L) 12/09/2024 01:42 AM    HEMOGLOBIN 10.3 (L) 12/09/2024 01:42 AM    HEMATOCRIT 32.6 (L) 12/09/2024 01:42 AM         Culture Results show:  No results found for this or any previous visit (from the past 720  hours).    Pain Level/Medicated:  Patient denies pain       INTERVENTIONS BY WOUND TEAM:  Chart and images reviewed. Discussed with bedside RN. All areas of concern (based on picture review, LDA review and discussion with bedside RN) have been thoroughly assessed. Documentation of areas based on significant findings. This RN in to assess patient. Performed standard wound care which includes appropriate positioning, dressing removal and non-selective debridement. Pictures and measurements obtained weekly if/when required.    Wound:  L lateral ankle  Preparation for Dressing removal: Open to air  Nany wound: Cleansed with No rinse foam soap and Moist warm washcloth, Prepped with Open to Air  Primary Dressing:  FREDDIE     Wound:  L elbow  Preparation for Dressing removal: Open to air  Cleansed/Non-selectively Debrided with:  No rinse foam soap and Moist warm washcloth  Nany wound: Cleansed with No rinse foam soap and Moist warm washcloth, Prepped with N/A  Primary Dressing:  FREDDIE and heel drsg ordered     Wound:  knees  Preparation for Dressing removal: Open to air  Cleansed/Non-selectively Debrided with:  No rinse foam soap and Moist warm washcloth  Nany wound: Cleansed with Moist warm washcloth, Prepped with N/A  Primary Dressing:  FREDDIE     Wound: L sacrum &coccyx  Preparation for Dressing removal: Removed without difficulty  Cleansed/Non-selectively Debrided with:  No rinse foam soap and Moist warm washcloth  Nany wound: Cleansed with No rinse foam soap and Moist warm washcloth, Prepped with N/A  Primary Dressing:  sacral offloading drsg         Advanced Wound Care Discharge Planning  Number of Clinicians necessary to complete wound care: 1  Is patient requiring IV pain medications for dressing changes:  No   Length of time for dressing change 45 min. (This does not include chart review, pre-medication time, set up, clean up or time spent charting.)    Interdisciplinary consultation: Patient, Bedside RN (Stan), .   Pressure injury and staging reviewed with N/A.    EVALUATION / RATIONALE FOR TREATMENT:     Date:  12/09/24  Wound Status:  Initial evaluation    Pt found down has unstageable pressure injuries to L sacrum, coccyx, L lateral ankle. Her L elbow with serous drainage, heel offloading drsg ordered. Both knees scabbed.  L flank with ecchymosis.         Goals:Waiting for wounds to reveal before there can be a decrease in wound area and depth.    NURSING PLAN OF CARE ORDERS:  Dressing changes: See Dressing Care orders  RN Prevention Protocol    NUTRITION RECOMMENDATIONS   Wound Team Recommendations:  N/A    DIET ORDERS (From admission to next 24h)       Start     Ordered    12/07/24 1929  Diet Order Diet: Regular  ALL MEALS        Question:  Diet:  Answer:  Regular    12/07/24 1929                    PREVENTATIVE INTERVENTIONS:    Q shift Tj - performed per nursing policy  Q shift pressure point assessments - performed per nursing policy    Surface/Positioning  Standard/trauma mattress - Currently in Place  Reposition q 2 hours - Currently in Place    Offloading/Redistribution  Sacral offloading dressing (Silicone dressing) - Currently in Place  Float Heels off Bed with Pillows - Currently in Place           Respiratory  Silicone O2 tubing - Currently in Place  Gray Foam Ear protectors - Currently in Place    Containment/Moisture Prevention    Purwick/Condom Cath - Currently in Place    Mobilization      Needs PT & OT      Anticipated discharge plans:  TBD        Vac Discharge Needs:  Vac Discharge plan is purely a recommendation from wound team and not a requirement for discharge unless otherwise stated by physician.  Not Applicable Pt not on a wound vac

## 2024-12-10 NOTE — DISCHARGE PLANNING
Anticipated Discharge Disposition: Per Floor CM     Action: VM left from [facility] regarding referral. Relayed message to CM on floor for follow up at  ref [phone]    Barriers to Discharge: Per Floor CM    Plan: No further ER CM needs

## 2024-12-10 NOTE — PROGRESS NOTES
[facility] Medicine Daily Progress Note    Date of Service  12/10/2024    Chief Complaint  Luis Felipe Hernandez is a 69 y.o. female admitted 12/7/2024 with found on floor for greater than 3 days    Hospital Course  Luis Felipe Hernandez is a 69 y.o. female admitted 12/7/2024 after being down for about 3  days.  She states she was her hallway on 12/4/2024 trying to reach something and was hit in the back of her head by some object.  She fell on the floor and was unable to completely get up.  She denies losing consciousness but states she must of been confused.  Her brother came to her house on 12/6/2024, he did not come in as she was unable to open the door, and she was able to answer his questions through the front door.  Due to unable to reach the patient, the patient's brother called 911 on 12/7/2024.     Found to have significant rhabdomyolysis as well as hypertensive urgency.  CPK greater than 12,000 on admission, currently trending down with IV hydration.  Transaminases also improving and renal function is doing well.    Interval Problem Update  12/10 and overall states that she is feeling better.  Initially she requested tramadol be discontinued but then asked for it back at a lower dose.  CPK is down to 9400, continue IVF at current rate until CPK <5k    I have discussed this patient's plan of care and discharge plan at IDT rounds today with Case Management, Nursing, Nursing leadership, and other members of the IDT team.    Consultants/Specialty  none    Code Status  Full Code    Disposition  The patient is not medically cleared for discharge to home or a post-acute facility.  Anticipate discharge to: skilled nursing facility    I have placed the appropriate orders for post-discharge needs.    Review of Systems  Review of Systems   Constitutional:  Negative for chills and fever.   HENT:  Negative for congestion.    Eyes:  Negative for blurred vision and photophobia.   Respiratory:  Negative for cough and shortness of  breath.    Cardiovascular:  Negative for chest pain, claudication and leg swelling.   Gastrointestinal:  Negative for abdominal pain, constipation, diarrhea, heartburn and vomiting.   Genitourinary:  Negative for dysuria and hematuria.   Musculoskeletal:  Negative for joint pain and myalgias.   Skin:  Negative for itching and rash.   Neurological:  Negative for dizziness, sensory change, speech change, weakness and headaches.   Psychiatric/Behavioral:  Negative for depression. The patient is not nervous/anxious and does not have insomnia.         Physical Exam  Temp:  [36 °C (96.8 °F)-36.6 °C (97.9 °F)] 36.6 °C (97.8 °F)  Pulse:  [63-74] 74  Resp:  [18] 18  BP: (137-173)/(68-84) 155/84  SpO2:  [95 %-99 %] 95 %    Physical Exam  Vitals and nursing note reviewed.   Constitutional:       General: She is not in acute distress.     Appearance: Normal appearance. She is not ill-appearing.   HENT:      Head: Normocephalic and atraumatic.      Nose: Nose normal.   Cardiovascular:      Rate and Rhythm: Normal rate and regular rhythm.      Heart sounds: Normal heart sounds. No murmur heard.  Pulmonary:      Effort: Pulmonary effort is normal.      Breath sounds: Normal breath sounds.   Abdominal:      General: Bowel sounds are normal. There is no distension.      Palpations: Abdomen is soft.   Musculoskeletal:         General: No swelling or tenderness.      Cervical back: Neck supple.      Comments: Left ankle slightly swollen, patient states this is the joint that usually swells from time to time.  No significant pain, not consistent with blood clot   Skin:     General: Skin is warm and dry.   Neurological:      General: No focal deficit present.      Mental Status: She is alert and oriented to person, place, and time.   Psychiatric:         Mood and Affect: Mood normal.         Fluids    Intake/Output Summary (Last 24 hours) at 12/10/2024 1406  Last data filed at 12/10/2024 0809  Gross per 24 hour   Intake 200 ml   Output  --   Net 200 ml        Laboratory  Recent Labs     12/08/24 0249 12/09/24 0142 12/10/24  0144   WBC 10.1 8.3 7.3   RBC 4.09* 3.98* 3.96*   HEMOGLOBIN 10.5* 10.3* 10.2*   HEMATOCRIT 33.1* 32.6* 32.4*   MCV 80.9* 81.9 81.8   MCH 25.7* 25.9* 25.8*   MCHC 31.7* 31.6* 31.5*   RDW 48.4 49.1 48.5   PLATELETCT 312 271 272   MPV 11.2 11.4 11.9     Recent Labs     12/08/24  0249 12/09/24  0142 12/10/24  0144   SODIUM 143 141 138   POTASSIUM 3.6 3.6 3.5*   CHLORIDE 111 109 106   CO2 21 22 22   GLUCOSE 125* 109* 107*   BUN 40* 36* 20   CREATININE 0.91 1.01 0.85   CALCIUM 8.6 8.4 8.2*                   Imaging  EC-ECHOCARDIOGRAM COMPLETE W/O CONT   Final Result      DX-KNEE 3 VIEWS RIGHT   Final Result      No radiographic evidence of acute traumatic injury.      CT-TSPINE W/O PLUS RECONS   Final Result      Multilevel degenerative disease and DISH without a definite acute fracture.      CT-CSPINE WITHOUT PLUS RECONS   Final Result      No acute fracture or dislocation cervical spine.      Multilevel disc and facet degeneration and mild degenerative subluxations.      CT-CHEST,ABDOMEN,PELVIS WITH   Final Result         1. Fractures of the left posterior 10th and 11th ribs.   2. Small soft tissue nodule in the right superior breast.   3. Atherosclerosis including coronary artery disease.   4. Pericardial effusion.   5. Hiatal hernia.   6. Diverticulosis.      CT-LSPINE W/O PLUS RECONS   Final Result      Advanced multilevel degenerative disease without a definite acute fracture.      CT-HEAD W/O   Final Result      There is no definite acute intracranial abnormality.               DX-CHEST-PORTABLE (1 VIEW)   Final Result      No definite acute cardiac or pulmonary abnormalities are identified.           Assessment/Plan  * Traumatic rhabdomyolysis (HCC)- (present on admission)  Assessment & Plan  Patient was lying on the floor for almost 72 hours with very little movement.  Continue IV fluids.  Creatinine is normal.  CPK  improved from > 06355 to 54022.  Monitor closely    Breast nodule- (present on admission)  Assessment & Plan  CT showed small soft tissue nodule in the right superior breast.  Findings were discussed with the patient, patient was recommended to follow-up with outpatient PCP for further imaging and possible biopsy.    ACP (advance care planning)- (present on admission)  Assessment & Plan  Full code    Coronary artery disease involving native coronary artery of native heart without angina pectoris- (present on admission)  Assessment & Plan  Patient had extensive calcification noted on CT. Troponin is mildly elevated, no ischemic changes seen on EKG. Echocardiogram showed LVEF of about 60% with grade 2 diastolic dysfunction. Recommend outpatient follow-up    Liver enzyme elevation- (present on admission)  Assessment & Plan  Likely secondary to rhabdomyolysis. On admission,  AST was 875, ALT was 302, alk phos and total bilirubin were normal. AST improved to 442, ALT improved to 201.  Monitor closely    Elevated troponin- (present on admission)  Assessment & Plan  EKG showed no acute ischemic findings.  Echocardiogram ordered.  Patient denied chest pain.  Monitor    Pericardial effusion- (present on admission)  Assessment & Plan  Pericardial effusion was seen on CT chest, abdomen, pelvis. Clinically stable.  Echocardiogram from 12/9/2024 shows trivial pericardial effusion. Monitor    Fever- (present on admission)  Assessment & Plan  Likely reactive from rhabdomyolysis. COVID, influenza A&B, RSV PCR were negative.  Leukocytosis resolved. Procalcitonin was normal    Closed fracture of multiple ribs of left side with routine healing- (present on admission)  Assessment & Plan  CT chest, abdomen, pelvis showed fractures of the left posterior 10th and 11th ribs.  Patient states that she can only tolerate ibuprofen for pain.  Omeprazole was added for stomach protection    Dehydration- (present on admission)  Assessment &  Plan  Patient was lying on the floor for greater than 72 hours, she states she only had access to distilled water.  Continue hydration    Hypernatremia- (present on admission)  Assessment & Plan  Resolved.  Monitor    Hypertensive urgency- (present on admission)  Assessment & Plan  Not on blood pressure medications at home, will start Norvasc with as needed hydralazine.  Pressure has improved.  Monitor closely and adjust medications as needed         VTE prophylaxis:    enoxaparin ppx      I have performed a physical exam and reviewed and updated ROS and Plan today (12/10/2024). In review of yesterday's note (12/9/2024), there are no changes except as documented above.    My total time spent caring for the patient on the day of the encounter was 54 minutes.   This does not include time spent on separately billable procedures/tests.

## 2024-12-10 NOTE — CARE PLAN
The patient is Stable - Low risk of patient condition declining or worsening    Shift Goals  Clinical Goals: Pain management  Patient Goals: Comfort  Family Goals: n/a    Progress made toward(s) clinical / shift goals:  Pain management    Patient is not progressing towards the following goals:    Problem: Skin Integrity  Goal: Skin integrity is maintained or improved  Outcome: Progressing     Problem: Respiratory  Goal: Patient will achieve/maintain optimum respiratory ventilation and gas exchange  Outcome: Progressing     Problem: Fall Risk  Goal: Patient will remain free from falls  Outcome: Progressing

## 2024-12-10 NOTE — PROGRESS NOTES
Hospital Medicine Daily Progress Note    Date of Service  12/9/2024    Chief Complaint  Luis Felipe Hernandez is a 69 y.o. female admitted 12/7/2024 after being down for about 3  days.  She states she was her hallway on 12/4/2024 trying to reach something and was hit in the back of her head by some object.  She fell on the floor and was unable to completely get up.  She denies losing consciousness but states she must of been confused.  Her brother came to her house on 12/6/2024, he did not come in as she was unable to open the door, and she was able to answer his questions through the front door.  Due to unable to reach the patient, the patient's brother called 911 on 12/7/2024.     Hospital Course  On admission, patient was febrile (101), blood pressure was elevated (209/75), WBCs were 12.2 K, lactic acid was normal x 3. CPK was > 14047. CT head and CXR were unremarkable. CT spine showed multilevel degenerative disc disease with no definite fractures. Right knee x-ray showed no evidence of acute traumatic injury.  CT chest, abdomen, pelvis showed fractures of the left posterior 10th and 11th ribs, and pericardial effusion.  Troponin was 61, EKG showed no acute ischemic findings.  Echocardiogram ordered.    COVID, influenza A&B, RSV PCR were negative. Procalcitonin was normal.  AST was 875, ALT was 302, alk phos and total bilirubin were normal.    Tramadol was added for pain.  Patient refused Tramadol/Opioids and requested ibuprofen.  Omeprazole was added for stomach protection    Interval Problem Update  Afebrile, blood pressure improved.  Leukocytosis resolved. CPK improved from 82141 -> 61578.  AST improved to 442, ALT improved to 201.  On IV fluids.  Pain from rib fractures is improving.    Echocardiogram shows trivial pericardial effusion, LVEF of 60%, grade 2 diastolic dysfunction.    I have discussed this patient's plan of care and discharge plan at IDT rounds today with Case Management, Nursing, Nursing leadership,  and other members of the IDT team.    Consultants/Specialty  None    Code Status  Full Code    Disposition  The patient is not medically cleared for discharge to home or a post-acute facility.  Anticipate discharge to: skilled nursing facility    I have placed the appropriate orders for post-discharge needs.    Review of Systems  Review of Systems   Constitutional:  Positive for malaise/fatigue.   HENT: Negative.     Eyes: Negative.    Respiratory: Negative.     Cardiovascular: Negative.    Gastrointestinal: Negative.    Genitourinary: Negative.    Musculoskeletal:  Positive for joint pain and myalgias.   Skin: Negative.    Neurological: Negative.    Endo/Heme/Allergies: Negative.    Psychiatric/Behavioral: Negative.          Physical Exam  Temp:  [36.1 °C (97 °F)-36.8 °C (98.3 °F)] 36.6 °C (97.9 °F)  Pulse:  [59-99] 69  Resp:  [17-20] 18  BP: (121-163)/(65-98) 155/68  SpO2:  [83 %-99 %] 95 %    Physical Exam  Constitutional:       Appearance: She is ill-appearing.   HENT:      Head: Normocephalic.      Mouth/Throat:      Mouth: Mucous membranes are moist.   Cardiovascular:      Rate and Rhythm: Normal rate.   Pulmonary:      Effort: Pulmonary effort is normal.   Abdominal:      Palpations: Abdomen is soft.   Musculoskeletal:      Cervical back: Normal range of motion.      Right lower leg: No edema.      Left lower leg: No edema.   Skin:     General: Skin is warm.      Findings: Bruising present.   Neurological:      General: No focal deficit present.      Mental Status: She is alert.   Psychiatric:         Mood and Affect: Mood normal.         Fluids    Intake/Output Summary (Last 24 hours) at 12/9/2024 1800  Last data filed at 12/9/2024 1200  Gross per 24 hour   Intake 480 ml   Output 425 ml   Net 55 ml        Laboratory  Recent Labs     12/07/24  1506 12/08/24  0249 12/09/24  0142   WBC 12.2* 10.1 8.3   RBC 5.16 4.09* 3.98*   HEMOGLOBIN 13.2 10.5* 10.3*   HEMATOCRIT 41.8 33.1* 32.6*   MCV 81.0* 80.9* 81.9   MCH  25.6* 25.7* 25.9*   MCHC 31.6* 31.7* 31.6*   RDW 47.9 48.4 49.1   PLATELETCT 388 312 271   MPV 10.8 11.2 11.4     Recent Labs     12/07/24  1506 12/08/24  0249 12/09/24  0142   SODIUM 148* 143 141   POTASSIUM 4.3 3.6 3.6   CHLORIDE 112 111 109   CO2 20 21 22   GLUCOSE 105* 125* 109*   BUN 41* 40* 36*   CREATININE 0.88 0.91 1.01   CALCIUM 9.5 8.6 8.4                   Imaging  EC-ECHOCARDIOGRAM COMPLETE W/O CONT   Final Result      DX-KNEE 3 VIEWS RIGHT   Final Result      No radiographic evidence of acute traumatic injury.      CT-TSPINE W/O PLUS RECONS   Final Result      Multilevel degenerative disease and DISH without a definite acute fracture.      CT-CSPINE WITHOUT PLUS RECONS   Final Result      No acute fracture or dislocation cervical spine.      Multilevel disc and facet degeneration and mild degenerative subluxations.      CT-CHEST,ABDOMEN,PELVIS WITH   Final Result         1. Fractures of the left posterior 10th and 11th ribs.   2. Small soft tissue nodule in the right superior breast.   3. Atherosclerosis including coronary artery disease.   4. Pericardial effusion.   5. Hiatal hernia.   6. Diverticulosis.      CT-LSPINE W/O PLUS RECONS   Final Result      Advanced multilevel degenerative disease without a definite acute fracture.      CT-HEAD W/O   Final Result      There is no definite acute intracranial abnormality.               DX-CHEST-PORTABLE (1 VIEW)   Final Result      No definite acute cardiac or pulmonary abnormalities are identified.           Assessment/Plan  * Traumatic rhabdomyolysis (HCC)- (present on admission)  Assessment & Plan  Patient was lying on the floor for almost 72 hours with very little movement.  Continue IV fluids.  Creatinine is normal.  CPK improved from > 40769 to 61112.  Monitor closely    Closed fracture of multiple ribs of left side with routine healing- (present on admission)  Assessment & Plan  CT chest, abdomen, pelvis showed fractures of the left posterior 10th  and 11th ribs.  Patient states that she can only tolerate ibuprofen for pain.  Omeprazole was added for stomach protection    Hypertensive urgency- (present on admission)  Assessment & Plan  Not on blood pressure medications at home, will start Norvasc with as needed hydralazine.  Pressure has improved.  Monitor closely and adjust medications as needed    Pericardial effusion- (present on admission)  Assessment & Plan  Pericardial effusion was seen on CT chest, abdomen, pelvis. Clinically stable.  Echocardiogram from 12/9/2024 shows trivial pericardial effusion. Monitor    Fever- (present on admission)  Assessment & Plan  Likely reactive from rhabdomyolysis. COVID, influenza A&B, RSV PCR were negative.  Leukocytosis resolved. Procalcitonin was normal    Coronary artery disease involving native coronary artery of native heart without angina pectoris- (present on admission)  Assessment & Plan  Patient had extensive calcification noted on CT. Troponin is mildly elevated, no ischemic changes seen on EKG. Echocardiogram showed LVEF of about 60% with grade 2 diastolic dysfunction. Recommend outpatient follow-up    Liver enzyme elevation- (present on admission)  Assessment & Plan  Likely secondary to rhabdomyolysis. On admission,  AST was 875, ALT was 302, alk phos and total bilirubin were normal. AST improved to 442, ALT improved to 201.  Monitor closely    Elevated troponin- (present on admission)  Assessment & Plan  EKG showed no acute ischemic findings.  Echocardiogram ordered.  Patient denied chest pain.  Monitor    Dehydration- (present on admission)  Assessment & Plan  Patient was lying on the floor for greater than 72 hours, she states she only had access to distilled water.  Continue hydration    Hypernatremia- (present on admission)  Assessment & Plan  Resolved.  Monitor    Breast nodule- (present on admission)  Assessment & Plan  CT showed small soft tissue nodule in the right superior breast.  Findings were  discussed with the patient, patient was recommended to follow-up with outpatient PCP for further imaging and possible biopsy.    ACP (advance care planning)- (present on admission)  Assessment & Plan  Full code         VTE prophylaxis: Lovenox

## 2024-12-10 NOTE — HOSPITAL COURSE
Luis Felipe Hernandez is a 69 y.o. female admitted 12/7/2024 after being down for about 3  days.  She states she was her hallway on 12/4/2024 trying to reach something and was hit in the back of her head by some object.  She fell on the floor and was unable to completely get up.  She denies losing consciousness but states she must of been confused.  Her brother came to her house on 12/6/2024, he did not come in as she was unable to open the door, and she was able to answer his questions through the front door.  Due to unable to reach the patient, the patient's brother called 911 on 12/7/2024.     Found to have significant rhabdomyolysis as well as hypertensive urgency.  CPK greater than 12,000 on admission, currently trending down with IV hydration.  Transaminases also improving and renal function is doing well.

## 2024-12-10 NOTE — PROGRESS NOTES
Telemetry Shift Summary     Rhythm: SR/SB  Rate: 50s-60s  Measurements: 0.20/0.08/0.40  Ectopy (reported by Monitor Tech): rare PVC/PAC     Normal Values  Rhythm: Sinus  HR:   Measurements: 0.12-0.20/0.06-0.10/0.30-0.52

## 2024-12-11 LAB
ALBUMIN SERPL BCP-MCNC: 2.7 G/DL (ref 3.2–4.9)
ALBUMIN/GLOB SERPL: 1 G/DL
ALP SERPL-CCNC: 61 U/L (ref 30–99)
ALT SERPL-CCNC: 178 U/L (ref 2–50)
ANION GAP SERPL CALC-SCNC: 9 MMOL/L (ref 7–16)
AST SERPL-CCNC: 424 U/L (ref 12–45)
BILIRUB SERPL-MCNC: 0.3 MG/DL (ref 0.1–1.5)
BUN SERPL-MCNC: 15 MG/DL (ref 8–22)
CALCIUM ALBUM COR SERPL-MCNC: 9.2 MG/DL (ref 8.5–10.5)
CALCIUM SERPL-MCNC: 8.2 MG/DL (ref 8.4–10.2)
CHLORIDE SERPL-SCNC: 105 MMOL/L (ref 96–112)
CK SERPL-CCNC: 7612 U/L (ref 0–154)
CO2 SERPL-SCNC: 24 MMOL/L (ref 20–33)
CREAT SERPL-MCNC: 0.88 MG/DL (ref 0.5–1.4)
ERYTHROCYTE [DISTWIDTH] IN BLOOD BY AUTOMATED COUNT: 48.3 FL (ref 35.9–50)
GFR SERPLBLD CREATININE-BSD FMLA CKD-EPI: 70 ML/MIN/1.73 M 2
GLOBULIN SER CALC-MCNC: 2.7 G/DL (ref 1.9–3.5)
GLUCOSE SERPL-MCNC: 102 MG/DL (ref 65–99)
HCT VFR BLD AUTO: 33.3 % (ref 37–47)
HGB BLD-MCNC: 10.4 G/DL (ref 12–16)
MCH RBC QN AUTO: 25.6 PG (ref 27–33)
MCHC RBC AUTO-ENTMCNC: 31.2 G/DL (ref 32.2–35.5)
MCV RBC AUTO: 82 FL (ref 81.4–97.8)
PLATELET # BLD AUTO: 286 K/UL (ref 164–446)
PMV BLD AUTO: 11.4 FL (ref 9–12.9)
POTASSIUM SERPL-SCNC: 3.6 MMOL/L (ref 3.6–5.5)
PROT SERPL-MCNC: 5.4 G/DL (ref 6–8.2)
RBC # BLD AUTO: 4.06 M/UL (ref 4.2–5.4)
SODIUM SERPL-SCNC: 138 MMOL/L (ref 135–145)
WBC # BLD AUTO: 7.6 K/UL (ref 4.8–10.8)

## 2024-12-11 PROCEDURE — A9270 NON-COVERED ITEM OR SERVICE: HCPCS | Performed by: INTERNAL MEDICINE

## 2024-12-11 PROCEDURE — 82550 ASSAY OF CK (CPK): CPT

## 2024-12-11 PROCEDURE — 700102 HCHG RX REV CODE 250 W/ 637 OVERRIDE(OP): Performed by: INTERNAL MEDICINE

## 2024-12-11 PROCEDURE — 700111 HCHG RX REV CODE 636 W/ 250 OVERRIDE (IP): Mod: JZ | Performed by: HOSPITALIST

## 2024-12-11 PROCEDURE — 700105 HCHG RX REV CODE 258: Performed by: INTERNAL MEDICINE

## 2024-12-11 PROCEDURE — 85027 COMPLETE CBC AUTOMATED: CPT

## 2024-12-11 PROCEDURE — 94760 N-INVAS EAR/PLS OXIMETRY 1: CPT

## 2024-12-11 PROCEDURE — 99233 SBSQ HOSP IP/OBS HIGH 50: CPT | Performed by: INTERNAL MEDICINE

## 2024-12-11 PROCEDURE — 770020 HCHG ROOM/CARE - TELE (206)

## 2024-12-11 PROCEDURE — 36415 COLL VENOUS BLD VENIPUNCTURE: CPT

## 2024-12-11 PROCEDURE — 80053 COMPREHEN METABOLIC PANEL: CPT

## 2024-12-11 RX ORDER — LABETALOL HYDROCHLORIDE 5 MG/ML
10 INJECTION, SOLUTION INTRAVENOUS EVERY 6 HOURS PRN
Status: DISCONTINUED | OUTPATIENT
Start: 2024-12-11 | End: 2024-12-13 | Stop reason: HOSPADM

## 2024-12-11 RX ADMIN — ENOXAPARIN SODIUM 40 MG: 100 INJECTION SUBCUTANEOUS at 16:52

## 2024-12-11 RX ADMIN — AMLODIPINE BESYLATE 2.5 MG: 5 TABLET ORAL at 05:22

## 2024-12-11 RX ADMIN — HYDRALAZINE HYDROCHLORIDE 10 MG: 20 INJECTION INTRAMUSCULAR; INTRAVENOUS at 16:15

## 2024-12-11 RX ADMIN — SODIUM CHLORIDE, POTASSIUM CHLORIDE, SODIUM LACTATE AND CALCIUM CHLORIDE: 600; 310; 30; 20 INJECTION, SOLUTION INTRAVENOUS at 14:42

## 2024-12-11 RX ADMIN — IBUPROFEN 400 MG: 400 TABLET, FILM COATED ORAL at 12:45

## 2024-12-11 RX ADMIN — IBUPROFEN 400 MG: 400 TABLET, FILM COATED ORAL at 19:59

## 2024-12-11 RX ADMIN — SODIUM CHLORIDE, POTASSIUM CHLORIDE, SODIUM LACTATE AND CALCIUM CHLORIDE 1000 ML: 600; 310; 30; 20 INJECTION, SOLUTION INTRAVENOUS at 23:02

## 2024-12-11 RX ADMIN — IBUPROFEN 400 MG: 400 TABLET, FILM COATED ORAL at 05:22

## 2024-12-11 RX ADMIN — SODIUM CHLORIDE, POTASSIUM CHLORIDE, SODIUM LACTATE AND CALCIUM CHLORIDE: 600; 310; 30; 20 INJECTION, SOLUTION INTRAVENOUS at 06:35

## 2024-12-11 RX ADMIN — OMEPRAZOLE 20 MG: 20 CAPSULE, DELAYED RELEASE ORAL at 05:23

## 2024-12-11 ASSESSMENT — ENCOUNTER SYMPTOMS
NERVOUS/ANXIOUS: 0
DIZZINESS: 0
CLAUDICATION: 0
WEAKNESS: 0
MYALGIAS: 0
VOMITING: 0
DIARRHEA: 0
PHOTOPHOBIA: 0
FEVER: 0
HEARTBURN: 0
BLURRED VISION: 0
COUGH: 0
SENSORY CHANGE: 0
SPEECH CHANGE: 0
DEPRESSION: 0
SHORTNESS OF BREATH: 0
INSOMNIA: 0
CHILLS: 0
ABDOMINAL PAIN: 0
HEADACHES: 0
CONSTIPATION: 0

## 2024-12-11 ASSESSMENT — PAIN DESCRIPTION - PAIN TYPE
TYPE: ACUTE PAIN
TYPE: ACUTE PAIN

## 2024-12-11 NOTE — DISCHARGE PLANNING
Case Management Discharge Planning    Admission Date: 12/7/2024  GMLOS: 3.3  ALOS: 4    6-Clicks ADL Score: 15  6-Clicks Mobility Score: 10  PT and/or OT Eval ordered: Yes  PT/OT:Recommending post acute placement  Post-acute Referrals Ordered: Yes  Post-acute Choice Obtained: Yes  Has referral(s) been sent to post-acute provider:  Yes      Anticipated Discharge Dispo: Discharge Disposition: D/T to SNF with Medicare cert in anticipation of skilled care (03)    DME Needed: Pending hospital course     Action(s) Taken: LMSW rounded with pt to gather SNF choice. Pt provided choice for [facility]. LMSW received signature and faxed choice form to [facility].     Escalations Completed: None    Medically Clear: No    Next Steps: F/U with [facility] to DC to [facility].     Barriers to Discharge: Medical clearance

## 2024-12-11 NOTE — PROGRESS NOTES
Telemetry Shift Summary     Rhythm: SR  Rate: 58-79  Measurements: .14/.08/.38  Ectopy (reported by Monitor Tech): r PVC     Normal Values  Rhythm: Sinus  HR:   Measurements: 0.12-0.20/0.06-0.10/0.30-0.52

## 2024-12-11 NOTE — DISCHARGE PLANNING
Case Management Discharge Planning    Admission Date: 12/7/2024  GMLOS: 3.3  ALOS: 3    6-Clicks ADL Score: 15  6-Clicks Mobility Score: 10  PT and/or OT Eval ordered: Yes  Post-acute Referrals Ordered: Yes  Post-acute Choice Obtained: Yes  Has referral(s) been sent to post-acute provider:  Yes      Anticipated Discharge Dispo: Discharge Disposition: D/T to SNF with Medicare cert in anticipation of skilled care (03) [facility] and [facility] accepted.    DME Needed: none    Action(s) Taken: discussed during IDT rounds Pt is not medically clear as CPK remains elevated @9447 although downtrending.     Met with pt and she is not decided on whether she wants to go to SNF. Explained that [facility] and [facility] have accepted her. CM gave choice list and highlighted [facility] and [facility] . Pt said she will think about it.     Escalations Completed: n/a    Medically Clear: no    Next Steps: follow up with MD tomorrow    Barriers to Discharge: needs medical clearance     Is the patient up for discharge tomorrow: no.

## 2024-12-11 NOTE — CARE PLAN
The patient is Stable - Low risk of patient condition declining or worsening    Shift Goals  Clinical Goals: monitor abnormal labs, wound care  Patient Goals: pain management  Family Goals: n/a    Progress made toward(s) clinical / shift goals:    Problem: Fluid Volume  Goal: Fluid volume balance will be maintained  Outcome: Progressing  Note: Patient maintained on IVF as per MD orders     Problem: Fall Risk  Goal: Patient will remain free from falls  Outcome: Progressing  Note: Patient free from falls,education educated on use of call light       Patient is not progressing towards the following goals:

## 2024-12-11 NOTE — PROGRESS NOTES
[facility] Medicine Daily Progress Note    Date of Service  12/11/2024    Chief Complaint  Luis Felipe Hernandez is a 69 y.o. female admitted 12/7/2024 with found on floor for greater than 3 days    Hospital Course  Luis Felipe Hernandez is a 69 y.o. female admitted 12/7/2024 after being down for about 3  days.  She states she was her hallway on 12/4/2024 trying to reach something and was hit in the back of her head by some object.  She fell on the floor and was unable to completely get up.  She denies losing consciousness but states she must of been confused.  Her brother came to her house on 12/6/2024, he did not come in as she was unable to open the door, and she was able to answer his questions through the front door.  Due to unable to reach the patient, the patient's brother called 911 on 12/7/2024.     Found to have significant rhabdomyolysis as well as hypertensive urgency.  CPK greater than 12,000 on admission, currently trending down with IV hydration.  Transaminases also improving and renal function is doing well.    Interval Problem Update  12/10 and overall states that she is feeling better.  Initially she requested tramadol be discontinued but then asked for it back at a lower dose.  CPK is down to 9400, continue IVF at current rate until CPK <5k  12/11 patient is feeling well today she is feeling slightly volume overloaded with the continued IV fluids.  CPK is down to 7612 today.  Continue with same rate.  She does have some swelling of her left ankle but nothing to be concerned about blood clot and she is on DVT prophylaxis.  Will need to monitor for an additional 24 hours after stopping fluid once CPK less than 5000 to make sure it continues to drop then she should be able to clear to skilled nursing facility.    I have discussed this patient's plan of care and discharge plan at IDT rounds today with Case Management, Nursing, Nursing leadership, and other members of the IDT  team.    Consultants/Specialty  none    Code Status  Full Code    Disposition  The patient is not medically cleared for discharge to home or a post-acute facility.  Anticipate discharge to: skilled nursing facility    I have placed the appropriate orders for post-discharge needs.    Review of Systems  Review of Systems   Constitutional:  Negative for chills and fever.   HENT:  Negative for congestion.    Eyes:  Negative for blurred vision and photophobia.   Respiratory:  Negative for cough and shortness of breath.    Cardiovascular:  Negative for chest pain, claudication and leg swelling.   Gastrointestinal:  Negative for abdominal pain, constipation, diarrhea, heartburn and vomiting.   Genitourinary:  Negative for dysuria and hematuria.   Musculoskeletal:  Negative for joint pain and myalgias.   Skin:  Negative for itching and rash.   Neurological:  Negative for dizziness, sensory change, speech change, weakness and headaches.   Psychiatric/Behavioral:  Negative for depression. The patient is not nervous/anxious and does not have insomnia.         Physical Exam  Temp:  [36.4 °C (97.5 °F)-36.8 °C (98.2 °F)] 36.8 °C (98.2 °F)  Pulse:  [64-74] 73  Resp:  [18-20] 18  BP: (136-185)/(55-75) 166/73  SpO2:  [92 %-98 %] 94 %    Physical Exam  Vitals and nursing note reviewed.   Constitutional:       General: She is not in acute distress.     Appearance: Normal appearance. She is not ill-appearing.   HENT:      Head: Normocephalic and atraumatic.      Nose: Nose normal.   Cardiovascular:      Rate and Rhythm: Normal rate and regular rhythm.      Heart sounds: Normal heart sounds. No murmur heard.  Pulmonary:      Effort: Pulmonary effort is normal.      Breath sounds: Normal breath sounds.   Abdominal:      General: Bowel sounds are normal. There is no distension.      Palpations: Abdomen is soft.   Musculoskeletal:         General: No swelling or tenderness.      Cervical back: Neck supple.      Left lower leg: Edema (Ankle  edema) present.      Comments: Left ankle slightly swollen, patient states this is the joint that usually swells from time to time.  No significant pain, not consistent with blood clot   Skin:     General: Skin is warm and dry.   Neurological:      General: No focal deficit present.      Mental Status: She is alert and oriented to person, place, and time.   Psychiatric:         Mood and Affect: Mood normal.         Fluids    Intake/Output Summary (Last 24 hours) at 12/11/2024 1408  Last data filed at 12/11/2024 0800  Gross per 24 hour   Intake 570 ml   Output 800 ml   Net -230 ml        Laboratory  Recent Labs     12/09/24  0142 12/10/24  0144 12/11/24  0132   WBC 8.3 7.3 7.6   RBC 3.98* 3.96* 4.06*   HEMOGLOBIN 10.3* 10.2* 10.4*   HEMATOCRIT 32.6* 32.4* 33.3*   MCV 81.9 81.8 82.0   MCH 25.9* 25.8* 25.6*   MCHC 31.6* 31.5* 31.2*   RDW 49.1 48.5 48.3   PLATELETCT 271 272 286   MPV 11.4 11.9 11.4     Recent Labs     12/09/24  0142 12/10/24  0144 12/11/24  0132   SODIUM 141 138 138   POTASSIUM 3.6 3.5* 3.6   CHLORIDE 109 106 105   CO2 22 22 24   GLUCOSE 109* 107* 102*   BUN 36* 20 15   CREATININE 1.01 0.85 0.88   CALCIUM 8.4 8.2* 8.2*                   Imaging  EC-ECHOCARDIOGRAM COMPLETE W/O CONT   Final Result      DX-KNEE 3 VIEWS RIGHT   Final Result      No radiographic evidence of acute traumatic injury.      CT-TSPINE W/O PLUS RECONS   Final Result      Multilevel degenerative disease and DISH without a definite acute fracture.      CT-CSPINE WITHOUT PLUS RECONS   Final Result      No acute fracture or dislocation cervical spine.      Multilevel disc and facet degeneration and mild degenerative subluxations.      CT-CHEST,ABDOMEN,PELVIS WITH   Final Result         1. Fractures of the left posterior 10th and 11th ribs.   2. Small soft tissue nodule in the right superior breast.   3. Atherosclerosis including coronary artery disease.   4. Pericardial effusion.   5. Hiatal hernia.   6. Diverticulosis.      CT-LSPINE  W/O PLUS RECONS   Final Result      Advanced multilevel degenerative disease without a definite acute fracture.      CT-HEAD W/O   Final Result      There is no definite acute intracranial abnormality.               DX-CHEST-PORTABLE (1 VIEW)   Final Result      No definite acute cardiac or pulmonary abnormalities are identified.           Assessment/Plan  * Traumatic rhabdomyolysis (HCC)- (present on admission)  Assessment & Plan  Patient was lying on the floor for almost 72 hours with very little movement.  Continue IV fluids.  Creatinine is normal.  CPK improved from > 33490 to 7612.  Monitor closely    Breast nodule- (present on admission)  Assessment & Plan  CT showed small soft tissue nodule in the right superior breast.  Findings were discussed with the patient, patient was recommended to follow-up with outpatient PCP for further imaging and possible biopsy.    ACP (advance care planning)- (present on admission)  Assessment & Plan  Full code    Coronary artery disease involving native coronary artery of native heart without angina pectoris- (present on admission)  Assessment & Plan  Patient had extensive calcification noted on CT. Troponin is mildly elevated, no ischemic changes seen on EKG. Echocardiogram showed LVEF of about 60% with grade 2 diastolic dysfunction. Recommend outpatient follow-up    Liver enzyme elevation- (present on admission)  Assessment & Plan  Transaminitis improving     Elevated troponin- (present on admission)  Assessment & Plan  EKG showed no acute ischemic findings.  Echocardiogram ordered.  Patient denied chest pain.  Monitor    Pericardial effusion- (present on admission)  Assessment & Plan  Pericardial effusion was seen on CT chest, abdomen, pelvis. Clinically stable.  Echocardiogram from 12/9/2024 shows trivial pericardial effusion. Monitor    Fever- (present on admission)  Assessment & Plan  Likely reactive from rhabdomyolysis. COVID, influenza A&B, RSV PCR were negative.   Leukocytosis resolved. Procalcitonin was normal    Closed fracture of multiple ribs of left side with routine healing- (present on admission)  Assessment & Plan  CT chest, abdomen, pelvis showed fractures of the left posterior 10th and 11th ribs.  Patient states that she can only tolerate ibuprofen for pain.  Omeprazole was added for stomach protection    Dehydration- (present on admission)  Assessment & Plan  Patient was lying on the floor for greater than 72 hours, she states she only had access to distilled water.  Continue hydration    Hypernatremia- (present on admission)  Assessment & Plan  Resolved.  Monitor    Hypertensive urgency- (present on admission)  Assessment & Plan  Not on blood pressure medications at home, will start Norvasc with as needed hydralazine.  Pressure has improved.  Monitor closely and adjust medications as needed         VTE prophylaxis:    enoxaparin ppx      I have performed a physical exam and reviewed and updated ROS and Plan today (12/11/2024). In review of yesterday's note (12/10/2024), there are no changes except as documented above.    My total time spent caring for the patient on the day of the encounter was 52 minutes.   This does not include time spent on separately billable procedures/tests.

## 2024-12-11 NOTE — THERAPY
Occupational Therapy Contact Note    Patient Name: Luis Felipe Hernandez  Age:  69 y.o., Sex:  female  Medical Record #: [id number]  Today's Date: 12/10/2024       12/10/24 1639   Treatment Variance   Reason For Missed Therapy Non-Medical - Other (Please Comment)  (Pt fatigued from being up earlier with nursing)   Interdisciplinary Plan of Care Collaboration   IDT Collaboration with  Nursing   Collaboration Comments Attempted OT.  Pt fatigued from being up earlier with nursing, and was so weak she required 2-3 person assist to get back to bed from chair.  Will follow and see again as able

## 2024-12-12 LAB
ANION GAP SERPL CALC-SCNC: 10 MMOL/L (ref 7–16)
BACTERIA BLD CULT: NORMAL
BUN SERPL-MCNC: 11 MG/DL (ref 8–22)
CALCIUM SERPL-MCNC: 8.2 MG/DL (ref 8.4–10.2)
CHLORIDE SERPL-SCNC: 107 MMOL/L (ref 96–112)
CK SERPL-CCNC: 4177 U/L (ref 0–154)
CO2 SERPL-SCNC: 23 MMOL/L (ref 20–33)
CREAT SERPL-MCNC: 0.68 MG/DL (ref 0.5–1.4)
GFR SERPLBLD CREATININE-BSD FMLA CKD-EPI: 93 ML/MIN/1.73 M 2
GLUCOSE SERPL-MCNC: 107 MG/DL (ref 65–99)
POTASSIUM SERPL-SCNC: 3.6 MMOL/L (ref 3.6–5.5)
SIGNIFICANT IND 70042: NORMAL
SITE SITE: NORMAL
SODIUM SERPL-SCNC: 140 MMOL/L (ref 135–145)
SOURCE SOURCE: NORMAL

## 2024-12-12 PROCEDURE — 80048 BASIC METABOLIC PNL TOTAL CA: CPT

## 2024-12-12 PROCEDURE — 700111 HCHG RX REV CODE 636 W/ 250 OVERRIDE (IP): Mod: JZ | Performed by: HOSPITALIST

## 2024-12-12 PROCEDURE — 97112 NEUROMUSCULAR REEDUCATION: CPT

## 2024-12-12 PROCEDURE — 700105 HCHG RX REV CODE 258: Performed by: INTERNAL MEDICINE

## 2024-12-12 PROCEDURE — 700102 HCHG RX REV CODE 250 W/ 637 OVERRIDE(OP): Performed by: HOSPITALIST

## 2024-12-12 PROCEDURE — 97530 THERAPEUTIC ACTIVITIES: CPT

## 2024-12-12 PROCEDURE — A9270 NON-COVERED ITEM OR SERVICE: HCPCS | Performed by: HOSPITALIST

## 2024-12-12 PROCEDURE — 700102 HCHG RX REV CODE 250 W/ 637 OVERRIDE(OP): Performed by: INTERNAL MEDICINE

## 2024-12-12 PROCEDURE — 82550 ASSAY OF CK (CPK): CPT

## 2024-12-12 PROCEDURE — 36415 COLL VENOUS BLD VENIPUNCTURE: CPT

## 2024-12-12 PROCEDURE — 99232 SBSQ HOSP IP/OBS MODERATE 35: CPT | Performed by: INTERNAL MEDICINE

## 2024-12-12 PROCEDURE — A9270 NON-COVERED ITEM OR SERVICE: HCPCS | Performed by: INTERNAL MEDICINE

## 2024-12-12 PROCEDURE — 97535 SELF CARE MNGMENT TRAINING: CPT

## 2024-12-12 PROCEDURE — 94760 N-INVAS EAR/PLS OXIMETRY 1: CPT

## 2024-12-12 PROCEDURE — 770001 HCHG ROOM/CARE - MED/SURG/GYN PRIV*

## 2024-12-12 RX ORDER — VITAMIN B COMPLEX
5000 TABLET ORAL DAILY
Status: DISCONTINUED | OUTPATIENT
Start: 2024-12-12 | End: 2024-12-13 | Stop reason: HOSPADM

## 2024-12-12 RX ADMIN — IBUPROFEN 400 MG: 400 TABLET, FILM COATED ORAL at 14:35

## 2024-12-12 RX ADMIN — ENOXAPARIN SODIUM 40 MG: 100 INJECTION SUBCUTANEOUS at 16:56

## 2024-12-12 RX ADMIN — IBUPROFEN 400 MG: 400 TABLET, FILM COATED ORAL at 20:43

## 2024-12-12 RX ADMIN — IBUPROFEN 400 MG: 400 TABLET, FILM COATED ORAL at 08:01

## 2024-12-12 RX ADMIN — SENNOSIDES AND DOCUSATE SODIUM 2 TABLET: 50; 8.6 TABLET ORAL at 16:55

## 2024-12-12 RX ADMIN — Medication 5000 UNITS: at 11:48

## 2024-12-12 RX ADMIN — OMEPRAZOLE 20 MG: 20 CAPSULE, DELAYED RELEASE ORAL at 05:09

## 2024-12-12 RX ADMIN — IBUPROFEN 400 MG: 400 TABLET, FILM COATED ORAL at 02:15

## 2024-12-12 RX ADMIN — AMLODIPINE BESYLATE 2.5 MG: 5 TABLET ORAL at 05:09

## 2024-12-12 RX ADMIN — SODIUM CHLORIDE, POTASSIUM CHLORIDE, SODIUM LACTATE AND CALCIUM CHLORIDE: 600; 310; 30; 20 INJECTION, SOLUTION INTRAVENOUS at 06:43

## 2024-12-12 ASSESSMENT — COGNITIVE AND FUNCTIONAL STATUS - GENERAL
MOVING TO AND FROM BED TO CHAIR: A LOT
TURNING FROM BACK TO SIDE WHILE IN FLAT BAD: A LOT
EATING MEALS: A LITTLE
SUGGESTED CMS G CODE MODIFIER DAILY ACTIVITY: CK
MOBILITY SCORE: 10
PERSONAL GROOMING: A LITTLE
DRESSING REGULAR LOWER BODY CLOTHING: A LOT
HELP NEEDED FOR BATHING: A LOT
STANDING UP FROM CHAIR USING ARMS: A LOT
MOVING FROM LYING ON BACK TO SITTING ON SIDE OF FLAT BED: A LOT
SUGGESTED CMS G CODE MODIFIER MOBILITY: CL
TOILETING: A LOT
DAILY ACTIVITIY SCORE: 15
DRESSING REGULAR UPPER BODY CLOTHING: A LITTLE
CLIMB 3 TO 5 STEPS WITH RAILING: TOTAL
WALKING IN HOSPITAL ROOM: TOTAL

## 2024-12-12 ASSESSMENT — ENCOUNTER SYMPTOMS
DIARRHEA: 0
ABDOMINAL PAIN: 0
SPEECH CHANGE: 0
WEAKNESS: 0
SHORTNESS OF BREATH: 0
NERVOUS/ANXIOUS: 0
INSOMNIA: 0
DEPRESSION: 0
MYALGIAS: 0
FEVER: 0
HEARTBURN: 0
CHILLS: 0
COUGH: 0
VOMITING: 0
CLAUDICATION: 0
HEADACHES: 0
DIZZINESS: 0
CONSTIPATION: 0
SENSORY CHANGE: 0
PHOTOPHOBIA: 0
BLURRED VISION: 0

## 2024-12-12 ASSESSMENT — PAIN DESCRIPTION - PAIN TYPE
TYPE: ACUTE PAIN

## 2024-12-12 ASSESSMENT — GAIT ASSESSMENTS
GAIT LEVEL OF ASSIST: UNABLE TO PARTICIPATE
DISTANCE (FEET): 1

## 2024-12-12 ASSESSMENT — FIBROSIS 4 INDEX: FIB4 SCORE: 7.78

## 2024-12-12 NOTE — DISCHARGE PLANNING
note:  Received a call back from Piero SOLIZ at [facility] confirming they have received the report. CM gave her an update on pt's status.

## 2024-12-12 NOTE — CARE PLAN
The patient is Stable - Low risk of patient condition declining or worsening    Shift Goals  Clinical Goals: monitor abnormal labs,wound care, monitor and manage pain, IVF  Patient Goals: rest, comfort, sit in chair today  Family Goals: MISBAH    Progress made toward(s) clinical / shift goals:    Problem: Knowledge Deficit - Standard  Goal: Patient and family/care givers will demonstrate understanding of plan of care, disease process/condition, diagnostic tests and medications  Description: Target End Date:  1-3 days or as soon as patient condition allows    Document in Patient Education    1.  Patient and family/caregiver oriented to unit, equipment, visitation policy and means for communicating concern  2.  Complete/review Learning Assessment  3.  Assess knowledge level of disease process/condition, treatment plan, diagnostic tests and medications  4.  Explain disease process/condition, treatment plan, diagnostic tests and medications  Outcome: Progressing     Problem: Hemodynamics  Goal: Patient's hemodynamics, fluid balance and neurologic status will be stable or improve  Description: Target End Date:  Prior to discharge or change in level of care    Document on Assessment and I/O flowsheet templates    1.  Monitor vital signs, pulse oximetry and cardiac monitor per provider order and/or policy  2.  Maintain blood pressure per provider order  3.  Hemodynamic monitoring per provider order  4.  Manage IV fluids and IV infusions  5.  Monitor intake and output  6.  Daily weights per unit policy or provider order  7.  Assess peripheral pulses and capillary refill  8.  Assess color and body temperature  9.  Position patient for maximum circulation/cardiac output  10. Monitor for signs/symptoms of excessive bleeding  11. Assess mental status, restlessness and changes in level of consciousness  12. Monitor temperature and report fever or hypothermia to provider immediately. Consideration of targeted temperature  management.  Outcome: Progressing     Problem: Skin Integrity  Goal: Skin integrity is maintained or improved  Description: Target End Date:  Prior to discharge or change in level of care    Document interventions on Skin Risk/Tj flowsheet groups and corresponding LDA    1.  Assess and monitor skin integrity, appearance and/or temperature  2.  Assess risk factors for impaired skin integrity and/or pressures ulcers  3.  Implement precautions to protect skin integrity in collaboration with interdisciplinary team  4.  Implement pressure ulcer prevention protocol if at risk for skin breakdown  5.  Confirm wound care consult if at risk for skin breakdown  6.  Ensure patient use of pressure relieving devices  (Low air loss bed, waffle overlay, heel protectors, ROHO cushion, etc)  Outcome: Progressing     Problem: Fall Risk  Goal: Patient will remain free from falls  Description: Target End Date:  Prior to discharge or change in level of care    Document interventions on the [facility] Fall Risk Assessment    1.  Assess for fall risk factors  2.  Implement fall precautions  Outcome: Progressing       Patient is not progressing towards the following goals:

## 2024-12-12 NOTE — CARE PLAN
The patient is Stable - Low risk of patient condition declining or worsening    Shift Goals  Clinical Goals: IVF, monitor bp, monitor labs  Patient Goals: Rest, have the PW changed  Family Goals: MISBAH    Progress made toward(s) clinical / shift goals:    Problem: Skin Integrity  Goal: Skin integrity is maintained or improved  Outcome: Progressing  Note: Patient with a sacral protector, encouraged on changing positions in bed       Patient is not progressing towards the following goals:

## 2024-12-12 NOTE — THERAPY
"Occupational Therapy  Daily Treatment     Patient Name: Luis Felipe Hernandez  Age:  69 y.o., Sex:  female  Medical Record #: [id number]  Today's Date: 12/12/2024     Precautions  Precautions: Fall Risk    Assessment    Pt able to tolerate standing a little better today once agreeable to try.  Limited by pain, fear.  Able to do some simepl self cares seated at EOB once up.  Pt will benefit from continued inpt post acute therapy once cleared medically.  OT will follow while in house.      Plan    Treatment Plan Status: Continue Current Treatment Plan  Type of Treatment: Self Care / Activities of Daily Living, Adaptive Equipment, Neuro Re-Education / Balance, Therapeutic Exercises, Therapeutic Activity, Family / Caregiver Training  Treatment Frequency: 3 Times per Week  Treatment Duration: Until Therapy Goals Met    DC Equipment Recommendations: Unable to determine at this time  Discharge Recommendations: Recommend post-acute placement for additional occupational therapy services prior to discharge home    Subjective    \"I'm really just afraid.  I don't want to fall.\"     Objective       12/12/24 1413   Pain 0 - 10 Group   Location Generalized;Leg   Location Orientation Right;Left   Description Aching;Cramping   Therapist Pain Assessment 8;During Activity;Post Activity;5;Nurse Notified   Cognition    Level of Consciousness Alert   Comments Pt tangential, initially wanting to refuse therapy stating that she was upset that therapy hadn't been in for a couple days.  Eventually agreeable, needs encouragement. Limited by fear and some mild confusion   Active ROM Upper Body   Comments LUE limited by pain in ribs when pushing or pulling with LUE   Strength Upper Body   Gross Strength Generalized Weakness, Equal Bilaterally.    Balance   Sitting Balance (Static) Good   Sitting Balance (Dynamic) Fair +   Standing Balance (Static) Poor -   Standing Balance (Dynamic) Trace +   Weight Shift Sitting Fair   Weight Shift Standing Poor "   Skilled Intervention Verbal Cuing;Tactile Cuing;Sequencing   Comments with FWW   Bed Mobility    Supine to Sit Minimal Assist   Sit to Supine Moderate Assist   Comments use of rail   Activities of Daily Living   Upper Body Dressing Minimal Assist   Lower Body Dressing Maximal Assist   Toileting Total Assist  (using purewik currently)   Skilled Intervention Verbal Cuing   How much help from another person does the patient currently need...   Putting on and taking off regular lower body clothing? 2   Bathing (including washing, rinsing, and drying)? 2   Toileting, which includes using a toilet, bedpan, or urinal? 2   Putting on and taking off regular upper body clothing? 3   Taking care of personal grooming such as brushing teeth? 3   Eating meals? 3   6 Clicks Daily Activity Score 15   Functional Mobility   Sit to Stand Maximal Assist  (2 attempts, 2 person assist for safety)   Bed, Chair, Wheelchair Transfer Refused   Toilet Transfers Refused   Mobility sit to stand x3, side stepping 3-4 steps bedside with FWW, modA for balance   Distance (Feet) 1   # of Times Distance was Traveled 1   Skilled Intervention Verbal Cuing;Tactile Cuing;Sequencing;Postural Facilitation   Activity Tolerance   Sitting Edge of Bed 15 min   Standing approx 1 min   Comments with FWW limited by weakness and leg pain   Short Term Goals   Short Term Goal # 1 Pt will transfer to [facility] for toileting with Nick in 4 visits   Goal Outcome # 1 Progressing slower than expected   Short Term Goal # 2 Pt will sponge bathe seated with SBA in 4 visits   Goal Outcome # 2 Progressing slower than expected   Short Term Goal # 3 Pt will dress LB with min A in 5 visits   Goal Outcome # 3 Progressing slower than expected   Short Term Goal # 4 Pt will stand 5 min at sink to groom with Nick in 5 visits   Goal Outcome # 4 Progressing slower than expected   Education Group   Education Provided Activities of Daily Living   ADL Patient Response  Patient;Acceptance;Explanation;Verbal Demonstration

## 2024-12-12 NOTE — PROGRESS NOTES
Telemetry Shift Summary     Rhythm: SR  HR Range:60-77  Ectopy: rPAC,rPVc  Measurements: 0.16/0.08/0.40    Normal Values  Measurements: 0.12- 0.20 / 0.08-0.10 / 0.30-0.52

## 2024-12-12 NOTE — PROGRESS NOTES
Telemetry Shift Summary     Rhythm: SR 1st degree  HR: 60-70  Ectopy: rPVC    Measurements: .24/.08/.40    Normal Values  Rhythm: SR  HR:   Measurements: 0.12-0.20/0.08-0.10/0.30-0.52

## 2024-12-12 NOTE — THERAPY
Physical Therapy   Daily Treatment     Patient Name: Luis Felipe Hernandez  Age:  69 y.o., Sex:  female  Medical Record #: [id number]  Today's Date: 12/12/2024     Precautions  Precautions: (P) Fall Risk    Assessment    Pt seen for PT treatment, pt requiring less assistance to EOB but continues to require extensive assist for sit to stand to FWW. Pt c/o BLE pain, weakness and fear of falling. Pt declining transfer to chair.   Recommend post acute placement.     Plan    Treatment Plan Status: (P) Continue Current Treatment Plan  Type of Treatment: Bed Mobility, Gait Training, Family / Caregiver Training, Neuro Re-Education / Balance, Self Care / Home Evaluation, Stair Training, Therapeutic Exercise, Therapeutic Activities  Treatment Frequency: 4 Times per Week  Treatment Duration: Until Therapy Goals Met    DC Equipment Recommendations: (P) Unable to determine at this time  Discharge Recommendations: (P) Recommend post-acute placement for additional physical therapy services prior to discharge home        Precautions   Precautions Fall Risk   Pain 0 - 10 Group   Location Generalized   Pain Rating Scale (NPRS) 5   Therapist Pain Assessment During Activity;8   Cognition    Level of Consciousness Alert   Active ROM Lower Body    Active ROM Lower Body  WDL   Strength Lower Body   Gross Strength Generalized Weakness, Equal Bilaterally   Sitting Lower Body Exercises   Sitting Lower Body Exercises Yes   Long Arc Quad 1 set of 10;Bilateral   Balance   Sitting Balance (Static) Good   Sitting Balance (Dynamic) Fair +   Standing Balance (Static) Poor -   Standing Balance (Dynamic) Trace +   Weight Shift Sitting Fair   Weight Shift Standing Poor   Skilled Intervention Tactile Cuing;Sequencing   Comments w/FWW   Bed Mobility    Supine to Sit Minimal Assist   Sit to Supine Moderate Assist   Gait Analysis   Gait Level Of Assist Unable to Participate   Comments limited by weakness and fear of falling   Functional Mobility   Sit to Stand  Maximal Assist  (x 2 trials)   Mobility sit to stands x 3, sides stepping 3 steps with FWW   Skilled Intervention Sequencing;Tactile Cuing   6 Clicks Assessment - How much HELP from another person do you currently need... (If the patient hasn't done an activity recently, how much help from another person do you think he/she would need if he/she tried?)   Turning from your back to your side while in a flat bed without using bedrails? 2   Moving from lying on your back to sitting on the side of a flat bed without using bedrails? 2   Moving to and from a bed to a chair (including a wheelchair)? 2   Standing up from a chair using your arms (e.g., wheelchair, or bedside chair)? 2   Walking in hospital room? 1   Climbing 3-5 steps with a railing? 1   6 clicks Mobility Score 10   Activity Tolerance   Sitting Edge of Bed 15 min   Short Term Goals    Short Term Goal # 1 Pt will be able to perform bed mobility and sup <>sit Indep in 6 visits.   Goal Outcome # 1 Progressing slower than expected   Short Term Goal # 2 Pt will be able to perform sit <>stand and transfer with FWW Cyrus in 6 visits.   Goal Outcome # 2 Progressing slower than expected   Short Term Goal # 3 Pt will be able to ambulate 100 ft with FWW Cyrus in 6 visits.   Goal Outcome # 3 Goal not met   Education Group   Use of Assistive Device Patient Response Patient;Acceptance;Explanation;Reinforcement Needed   Exercises - Seated Patient Response Patient;Acceptance;Explanation;Reinforcement Needed   Physical Therapy Treatment Plan   Physical Therapy Treatment Plan Continue Current Treatment Plan   Anticipated Discharge Equipment and Recommendations   DC Equipment Recommendations Unable to determine at this time   Discharge Recommendations Recommend post-acute placement for additional physical therapy services prior to discharge home   Interdisciplinary Plan of Care Collaboration   IDT Collaboration with  Nursing;Certified Nursing Assistant;Occupational Therapist    Patient Position at End of Therapy In Bed;Call Light within Reach;Tray Table within Reach;Phone within Reach  (RN notified)   Session Information   Date / Session Number  12/1-2 ( 2/4, 12/15)

## 2024-12-12 NOTE — DOCUMENTATION QUERY
[facility]                                                                       Query Response Note      PATIENT:               MARK FRAZIER  ACCT #:                  [id number]  MRN:                     [id number]  :                      1955  ADMIT DATE:       2024 2:55 PM  DISCH DATE:          RESPONDING  PROVIDER #:        [id number]           QUERY TEXT:    The wound care consult indicates this patient is being treated for ulcer of the following site:      Pressure injury coccyx, sacrum unstageable     Based on your medical judgement, can you please confirm this finding?    Note: If you agree with a diagnosis listed, please remember to include it in your concurrent daily documentation and onto the Discharge Summary    The patient's Clinical Indicators include:  - Findings:  Wound care consult :  Pressure injury coccyx, sacrum unstageable POA, Date First Assessed/Time First Assessed: 24    - Treatments:  wound care consult, offloading, normal saline irrigation    - Risk factors:  Rhabdomyolysis, dehydration, hypovolemia, fluid overload, falls    Thank You,  Maude Martino RN  Clinical Documentation   [email or web address]  Connect via [email or web address]  Options provided:   -- Pressure injury coccyx, sacrum unstageable present on admission   -- Wound is not a Pressure Ulcer/DTI, (please specify type of wound)   -- Other explanation, (please specify other explanation)      Query created by: Maude Martino on 2024 2:21 PM    RESPONSE TEXT:    Pressure injury coccyx, sacrum unstageable present on admission          Electronically signed by:  COLTON BANSAL DO 2024 2:47 PM

## 2024-12-12 NOTE — PROGRESS NOTES
[facility] Daily Progress Note    Date of Service  12/12/2024    Chief Complaint  Luis Felipe Hernandez is a 69 y.o. female admitted 12/7/2024 with found on floor for greater than 3 days    Hospital Course  Luis Felipe Hernandez is a 69 y.o. female admitted 12/7/2024 after being down for about 3  days.  She states she was her hallway on 12/4/2024 trying to reach something and was hit in the back of her head by some object.  She fell on the floor and was unable to completely get up.  She denies losing consciousness but states she must of been confused.  Her brother came to her house on 12/6/2024, he did not come in as she was unable to open the door, and she was able to answer his questions through the front door.  Due to unable to reach the patient, the patient's brother called 911 on 12/7/2024.     Found to have significant rhabdomyolysis as well as hypertensive urgency.  CPK greater than 12,000 on admission, currently trending down with IV hydration.  Transaminases also improving and renal function is doing well.    Interval Problem Update  12/10 and overall states that she is feeling better.  Initially she requested tramadol be discontinued but then asked for it back at a lower dose.  CPK is down to 9400, continue IVF at current rate until CPK <5k  12/11 patient is feeling well today she is feeling slightly volume overloaded with the continued IV fluids.  CPK is down to 7612 today.  Continue with same rate.  She does have some swelling of her left ankle but nothing to be concerned about blood clot and she is on DVT prophylaxis.  Will need to monitor for an additional 24 hours after stopping fluid once CPK less than 5000 to make sure it continues to drop then she should be able to clear to skilled nursing facility.  12/12: Patient without complaints other than the reaction that she had to the hydralazine yesterday afternoon when she was hypertensive.  Her hypertension is likely related to the high rate of fluids that she  had replaced to protect her kidneys during rhabdomyolysis.  CPK is down to 4100 therefore fluids have been discontinued.  Will check CPK again tomorrow as long as it remains less than 5000 she should be able to transition to skilled nursing.  I do believe her hypertension is self-limited and as she continues to auto diurese the fluids that she received blood pressure should improve.    I have discussed this patient's plan of care and discharge plan at IDT rounds today with Case Management, Nursing, Nursing leadership, and other members of the IDT team.    Consultants/Specialty  none    Code Status  Full Code    Disposition  The patient is not medically cleared for discharge to home or a post-acute facility.  Anticipate discharge to: skilled nursing facility    I have placed the appropriate orders for post-discharge needs.    Review of Systems  Review of Systems   Constitutional:  Negative for chills and fever.   HENT:  Negative for congestion.    Eyes:  Negative for blurred vision and photophobia.   Respiratory:  Negative for cough and shortness of breath.    Cardiovascular:  Negative for chest pain, claudication and leg swelling.   Gastrointestinal:  Negative for abdominal pain, constipation, diarrhea, heartburn and vomiting.   Genitourinary:  Negative for dysuria and hematuria.   Musculoskeletal:  Negative for joint pain and myalgias.   Skin:  Negative for itching and rash.   Neurological:  Negative for dizziness, sensory change, speech change, weakness and headaches.   Psychiatric/Behavioral:  Negative for depression. The patient is not nervous/anxious and does not have insomnia.         Physical Exam  Temp:  [36.1 °C (96.9 °F)-37 °C (98.6 °F)] 36.9 °C (98.4 °F)  Pulse:  [62-80] 77  Resp:  [18-20] 18  BP: (148-171)/(67-82) 153/71  SpO2:  [87 %-100 %] 92 %    Physical Exam  Vitals and nursing note reviewed.   Constitutional:       General: She is not in acute distress.     Appearance: Normal appearance. She is not  ill-appearing.   HENT:      Head: Normocephalic and atraumatic.      Nose: Nose normal.   Cardiovascular:      Rate and Rhythm: Normal rate and regular rhythm.      Heart sounds: Normal heart sounds. No murmur heard.  Pulmonary:      Effort: Pulmonary effort is normal.      Breath sounds: Normal breath sounds.   Abdominal:      General: Bowel sounds are normal. There is no distension.      Palpations: Abdomen is soft.   Musculoskeletal:         General: No swelling or tenderness.      Cervical back: Neck supple.      Left lower leg: Edema (Ankle edema, slightly improved) present.      Comments: Left ankle slightly swollen, patient states this is the joint that usually swells from time to time.  No significant pain, not consistent with blood clot   Skin:     General: Skin is warm and dry.   Neurological:      General: No focal deficit present.      Mental Status: She is alert and oriented to person, place, and time.   Psychiatric:         Mood and Affect: Mood normal.         Fluids    Intake/Output Summary (Last 24 hours) at 12/12/2024 1300  Last data filed at 12/12/2024 0600  Gross per 24 hour   Intake 700 ml   Output 2100 ml   Net -1400 ml        Laboratory  Recent Labs     12/10/24  0144 12/11/24  0132   WBC 7.3 7.6   RBC 3.96* 4.06*   HEMOGLOBIN 10.2* 10.4*   HEMATOCRIT 32.4* 33.3*   MCV 81.8 82.0   MCH 25.8* 25.6*   MCHC 31.5* 31.2*   RDW 48.5 48.3   PLATELETCT 272 286   MPV 11.9 11.4     Recent Labs     12/10/24  0144 12/11/24  0132 12/12/24  0746   SODIUM 138 138 140   POTASSIUM 3.5* 3.6 3.6   CHLORIDE 106 105 107   CO2 22 24 23   GLUCOSE 107* 102* 107*   BUN 20 15 11   CREATININE 0.85 0.88 0.68   CALCIUM 8.2* 8.2* 8.2*                   Imaging  EC-ECHOCARDIOGRAM COMPLETE W/O CONT   Final Result      DX-KNEE 3 VIEWS RIGHT   Final Result      No radiographic evidence of acute traumatic injury.      CT-TSPINE W/O PLUS RECONS   Final Result      Multilevel degenerative disease and DISH without a definite acute  fracture.      CT-CSPINE WITHOUT PLUS RECONS   Final Result      No acute fracture or dislocation cervical spine.      Multilevel disc and facet degeneration and mild degenerative subluxations.      CT-CHEST,ABDOMEN,PELVIS WITH   Final Result         1. Fractures of the left posterior 10th and 11th ribs.   2. Small soft tissue nodule in the right superior breast.   3. Atherosclerosis including coronary artery disease.   4. Pericardial effusion.   5. Hiatal hernia.   6. Diverticulosis.      CT-LSPINE W/O PLUS RECONS   Final Result      Advanced multilevel degenerative disease without a definite acute fracture.      CT-HEAD W/O   Final Result      There is no definite acute intracranial abnormality.               DX-CHEST-PORTABLE (1 VIEW)   Final Result      No definite acute cardiac or pulmonary abnormalities are identified.           Assessment/Plan  * Traumatic rhabdomyolysis (HCC)- (present on admission)  Assessment & Plan  Patient was lying on the floor for almost 72 hours with very little movement.  Continue IV fluids.  Creatinine is normal.  CPK improved from > 31374 to 7612.  Monitor closely    12/12: CPK down to 4100, discontinue IV fluids and monitor CPK if no increase in the next 24 hours okay to transition to skilled nursing    Breast nodule- (present on admission)  Assessment & Plan  CT showed small soft tissue nodule in the right superior breast.  Findings were discussed with the patient, patient was recommended to follow-up with outpatient PCP for further imaging and possible biopsy.    ACP (advance care planning)- (present on admission)  Assessment & Plan  Full code    Coronary artery disease involving native coronary artery of native heart without angina pectoris- (present on admission)  Assessment & Plan  Patient had extensive calcification noted on CT. Troponin is mildly elevated, no ischemic changes seen on EKG. Echocardiogram showed LVEF of about 60% with grade 2 diastolic dysfunction. Recommend  outpatient follow-up    Liver enzyme elevation- (present on admission)  Assessment & Plan  Transaminitis improving     Elevated troponin- (present on admission)  Assessment & Plan  EKG showed no acute ischemic findings.  Echocardiogram ordered.  Patient denied chest pain.  Monitor    Pericardial effusion- (present on admission)  Assessment & Plan  Pericardial effusion was seen on CT chest, abdomen, pelvis. Clinically stable.  Echocardiogram from 12/9/2024 shows trivial pericardial effusion. Monitor    Fever- (present on admission)  Assessment & Plan  Likely reactive from rhabdomyolysis. COVID, influenza A&B, RSV PCR were negative.  Leukocytosis resolved. Procalcitonin was normal    Closed fracture of multiple ribs of left side with routine healing- (present on admission)  Assessment & Plan  CT chest, abdomen, pelvis showed fractures of the left posterior 10th and 11th ribs.  Patient states that she can only tolerate ibuprofen for pain.  Omeprazole was added for stomach protection    Dehydration- (present on admission)  Assessment & Plan  Patient was lying on the floor for greater than 72 hours, she states she only had access to distilled water.  Continue hydration    Hypernatremia- (present on admission)  Assessment & Plan  Resolved.  Monitor    Hypertensive urgency- (present on admission)  Assessment & Plan  Not on blood pressure medications at home, will start Norvasc with as needed hydralazine.  Pressure has improved.  Monitor closely and adjust medications as needed         VTE prophylaxis:    enoxaparin ppx      I have performed a physical exam and reviewed and updated ROS and Plan today (12/12/2024). In review of yesterday's note (12/11/2024), there are no changes except as documented above.

## 2024-12-12 NOTE — CARE PLAN
The patient is Stable - Low risk of patient condition declining or worsening    Shift Goals  Clinical Goals: IVF, CPK  Patient Goals: Physical therapy  Family Goals: MISBAH    Progress made toward(s) clinical / shift goals:    Problem: Knowledge Deficit - Standard  Goal: Patient and family/care givers will demonstrate understanding of plan of care, disease process/condition, diagnostic tests and medications  Description: Target End Date:  1-3 days or as soon as patient condition allows    Document in Patient Education    1.  Patient and family/caregiver oriented to unit, equipment, visitation policy and means for communicating concern  2.  Complete/review Learning Assessment  3.  Assess knowledge level of disease process/condition, treatment plan, diagnostic tests and medications  4.  Explain disease process/condition, treatment plan, diagnostic tests and medications  Outcome: Progressing     Problem: Hemodynamics  Goal: Patient's hemodynamics, fluid balance and neurologic status will be stable or improve  Description: Target End Date:  Prior to discharge or change in level of care    Document on Assessment and I/O flowsheet templates    1.  Monitor vital signs, pulse oximetry and cardiac monitor per provider order and/or policy  2.  Maintain blood pressure per provider order  3.  Hemodynamic monitoring per provider order  4.  Manage IV fluids and IV infusions  5.  Monitor intake and output  6.  Daily weights per unit policy or provider order  7.  Assess peripheral pulses and capillary refill  8.  Assess color and body temperature  9.  Position patient for maximum circulation/cardiac output  10. Monitor for signs/symptoms of excessive bleeding  11. Assess mental status, restlessness and changes in level of consciousness  12. Monitor temperature and report fever or hypothermia to provider immediately. Consideration of targeted temperature management.  Outcome: Progressing     Problem: Fluid Volume  Goal: Fluid volume  balance will be maintained  Description: Target End Date:  Prior to discharge or change in level of care    Document on I/O flowsheet    1.  Monitor intake and output as ordered  2.  Promote oral intake as appropriate  3.  Report inadequate intake or output to physician  4.  Administer IV therapy as ordered  5.  Weights per provider order  6.  Assess for signs and symptoms of bleeding  7.  Monitor for signs of fluid overload (respiratory changes, edema, weight gain, increased abdominal girth)  8.  Monitor of signs for inadequate fluid volume (poor skin turgor, dry mucous membranes)  9.  Instruct patient on adherence to fluid restrictions  Outcome: Progressing     Problem: Urinary - Renal Perfusion  Goal: Ability to achieve and maintain adequate renal perfusion and functioning will improve  Description: Target End Date:  Prior to discharge or change in level of care    Document on I/O and Assessment flowsheet    1.  Urine output will remain greater than 0.5ml/Kg/HR  2.  Monitor amount and/or characteristics of urine per order/policy. Specific gravity per order/policy  3.  Assess signs and symptoms of renal dysfunction  Outcome: Progressing     Problem: Respiratory  Goal: Patient will achieve/maintain optimum respiratory ventilation and gas exchange  Description: Target End Date:  Prior to discharge or change in level of care    Document on Assessment flowsheet    1.  Assess and monitor rate, rhythm, depth and effort of respiration  2.  Breath sounds assessed qshift and/or as needed  3.  Assess O2 saturation, administer/titrate oxygen as ordered  4.  Position patient for maximum ventilatory efficiency  5.  Turn, cough, and deep breath with splinting to improve effectiveness  6.  Collaborate with RT to administer medication/treatments per order  7.  Encourage use of incentive spirometer and encourage patient to cough after use and utilize splinting techniques if applicable  8.  Airway suctioning  9.  Monitor sputum  production for changes in color, consistency and frequency  10. Perform frequent oral hygiene  11. Alternate physical activity with rest periods  Outcome: Progressing  Note: O2>90% on room air     Problem: Physical Regulation  Goal: Diagnostic test results will improve  Description: Target End Date:  Prior to discharge or change in level of care    1.  Monitor lactic acid levels  2.  Monitor ABG's  3.  Monitor diagnostic test results  Outcome: Progressing     Problem: Skin Integrity  Goal: Skin integrity is maintained or improved  Description: Target End Date:  Prior to discharge or change in level of care    Document interventions on Skin Risk/Tj flowsheet groups and corresponding LDA    1.  Assess and monitor skin integrity, appearance and/or temperature  2.  Assess risk factors for impaired skin integrity and/or pressures ulcers  3.  Implement precautions to protect skin integrity in collaboration with interdisciplinary team  4.  Implement pressure ulcer prevention protocol if at risk for skin breakdown  5.  Confirm wound care consult if at risk for skin breakdown  6.  Ensure patient use of pressure relieving devices  (Low air loss bed, waffle overlay, heel protectors, ROHO cushion, etc)  Outcome: Progressing  Note: Pt able to turn self     Problem: Fall Risk  Goal: Patient will remain free from falls  Description: Target End Date:  Prior to discharge or change in level of care    Document interventions on the [facility] Fall Risk Assessment    1.  Assess for fall risk factors  2.  Implement fall precautions  Outcome: Progressing     Problem: Pain - Standard  Goal: Alleviation of pain or a reduction in pain to the patient’s comfort goal  Description: Target End Date:  Prior to discharge or change in level of care    Document on Vitals flowsheet    1.  Document pain using the appropriate pain scale per order or unit policy  2.  Educate and implement non-pharmacologic comfort measures (i.e. relaxation,  distraction, massage, cold/heat therapy, etc.)  3.  Pain management medications as ordered  4.  Reassess pain after pain med administration per policy  5.  If opiods administered assess patient's response to pain medication is appropriate per POSS sedation scale  6.  Follow pain management plan developed in collaboration with patient and interdisciplinary team (including palliative care or pain specialists if applicable)  Outcome: Progressing       Patient is not progressing towards the following goals:

## 2024-12-13 VITALS
BODY MASS INDEX: 39.65 KG/M2 | SYSTOLIC BLOOD PRESSURE: 142 MMHG | HEIGHT: 63 IN | RESPIRATION RATE: 18 BRPM | HEART RATE: 73 BPM | WEIGHT: 223.77 LBS | TEMPERATURE: 96.9 F | DIASTOLIC BLOOD PRESSURE: 62 MMHG | OXYGEN SATURATION: 96 %

## 2024-12-13 LAB
ALBUMIN SERPL BCP-MCNC: 2.7 G/DL (ref 3.2–4.9)
ALBUMIN/GLOB SERPL: 1.1 G/DL
ALP SERPL-CCNC: 57 U/L (ref 30–99)
ALT SERPL-CCNC: 132 U/L (ref 2–50)
ANION GAP SERPL CALC-SCNC: 9 MMOL/L (ref 7–16)
AST SERPL-CCNC: 226 U/L (ref 12–45)
BACTERIA BLD CULT: NORMAL
BILIRUB SERPL-MCNC: 0.3 MG/DL (ref 0.1–1.5)
BUN SERPL-MCNC: 13 MG/DL (ref 8–22)
CALCIUM ALBUM COR SERPL-MCNC: 9.3 MG/DL (ref 8.5–10.5)
CALCIUM SERPL-MCNC: 8.3 MG/DL (ref 8.4–10.2)
CHLORIDE SERPL-SCNC: 107 MMOL/L (ref 96–112)
CK SERPL-CCNC: 1819 U/L (ref 0–154)
CO2 SERPL-SCNC: 24 MMOL/L (ref 20–33)
CREAT SERPL-MCNC: 0.76 MG/DL (ref 0.5–1.4)
GFR SERPLBLD CREATININE-BSD FMLA CKD-EPI: 84 ML/MIN/1.73 M 2
GLOBULIN SER CALC-MCNC: 2.5 G/DL (ref 1.9–3.5)
GLUCOSE SERPL-MCNC: 103 MG/DL (ref 65–99)
POTASSIUM SERPL-SCNC: 3.6 MMOL/L (ref 3.6–5.5)
PROT SERPL-MCNC: 5.2 G/DL (ref 6–8.2)
SIGNIFICANT IND 70042: NORMAL
SITE SITE: NORMAL
SODIUM SERPL-SCNC: 140 MMOL/L (ref 135–145)
SOURCE SOURCE: NORMAL

## 2024-12-13 PROCEDURE — A9270 NON-COVERED ITEM OR SERVICE: HCPCS | Performed by: INTERNAL MEDICINE

## 2024-12-13 PROCEDURE — 36415 COLL VENOUS BLD VENIPUNCTURE: CPT

## 2024-12-13 PROCEDURE — 99239 HOSP IP/OBS DSCHRG MGMT >30: CPT | Performed by: INTERNAL MEDICINE

## 2024-12-13 PROCEDURE — 700102 HCHG RX REV CODE 250 W/ 637 OVERRIDE(OP): Performed by: INTERNAL MEDICINE

## 2024-12-13 PROCEDURE — 80053 COMPREHEN METABOLIC PANEL: CPT

## 2024-12-13 PROCEDURE — 82550 ASSAY OF CK (CPK): CPT

## 2024-12-13 RX ORDER — AMLODIPINE BESYLATE 2.5 MG/1
2.5 TABLET ORAL DAILY
Status: SHIPPED
Start: 2024-12-14

## 2024-12-13 RX ORDER — IBUPROFEN 400 MG/1
400 TABLET, FILM COATED ORAL EVERY 6 HOURS PRN
Status: SHIPPED
Start: 2024-12-13

## 2024-12-13 RX ORDER — TRAMADOL HYDROCHLORIDE 25 MG/1
25 TABLET, COATED ORAL EVERY 6 HOURS PRN
Qty: 6 TABLET | Refills: 0 | Status: SHIPPED | OUTPATIENT
Start: 2024-12-13 | End: 2024-12-15

## 2024-12-13 RX ADMIN — IBUPROFEN 400 MG: 400 TABLET, FILM COATED ORAL at 09:09

## 2024-12-13 RX ADMIN — Medication 5000 UNITS: at 09:09

## 2024-12-13 RX ADMIN — IBUPROFEN 400 MG: 400 TABLET, FILM COATED ORAL at 02:48

## 2024-12-13 RX ADMIN — OMEPRAZOLE 20 MG: 20 CAPSULE, DELAYED RELEASE ORAL at 06:24

## 2024-12-13 RX ADMIN — AMLODIPINE BESYLATE 2.5 MG: 5 TABLET ORAL at 06:24

## 2024-12-13 ASSESSMENT — PAIN DESCRIPTION - PAIN TYPE
TYPE: ACUTE PAIN

## 2024-12-13 NOTE — CARE PLAN
The patient is Stable - Low risk of patient condition declining or worsening    Shift Goals  Clinical Goals: Monitor labs  Patient Goals: sleep  Family Goals: dee dee    Progress made toward(s) clinical / shift goals:    Problem: Knowledge Deficit - Standard  Goal: Patient and family/care givers will demonstrate understanding of plan of care, disease process/condition, diagnostic tests and medications  Outcome: Progressing     Problem: Hemodynamics  Goal: Patient's hemodynamics, fluid balance and neurologic status will be stable or improve  Outcome: Progressing     Problem: Fluid Volume  Goal: Fluid volume balance will be maintained  Outcome: Progressing     Problem: Urinary - Renal Perfusion  Goal: Ability to achieve and maintain adequate renal perfusion and functioning will improve  Outcome: Progressing     Problem: Skin Integrity  Goal: Skin integrity is maintained or improved  Outcome: Progressing     Problem: Fall Risk  Goal: Patient will remain free from falls  Outcome: Progressing     Problem: Pain - Standard  Goal: Alleviation of pain or a reduction in pain to the patient’s comfort goal  Outcome: Progressing       Patient is not progressing towards the following goals:

## 2024-12-13 NOTE — PROGRESS NOTES
RN called [facility] and gave report to ДМИТРИЙ Sosa, prior to transport.

## 2024-12-13 NOTE — DISCHARGE PLANNING
DC Transport Scheduled    Transport Company Scheduled:  [employer]  Spoke with  [employer] to schedule transport.      Scheduled Date: 12/13/2024  Scheduled Time: 1300    Transport Type: [facility]  Destination: [facility]     Notified care team of scheduled transport via [employer].     If there are any changes needed to the DC transportation scheduled, please contact [facility] at ext. [phone] between the hours of 8174-2207. If outside those hours, contact the ED Case Manager at ext. [phone].

## 2024-12-13 NOTE — CARE PLAN
The patient is Stable - Low risk of patient condition declining or worsening    Shift Goals  Clinical Goals: Monitor labs and vitals; pain management; safety  Patient Goals: rest and comfort  Family Goals: dee dee    Progress made toward(s) clinical / shift goals: Patient assessed and consulted with MD at bedside. Patient managed pain with pharmacological and non-pharmacological methods.    Problem: Knowledge Deficit - Standard  Goal: Patient and family/care givers will demonstrate understanding of plan of care, disease process/condition, diagnostic tests and medications  Outcome: Progressing  Note: Patient verbalizes understanding of plan of care and barriers to discharge. Patient asks appropriate questions about plan of care.       Problem: Pain - Standard  Goal: Alleviation of pain or a reduction in pain to the patient’s comfort goal  Outcome: Progressing  Note: Patient verbalizes pain using 0-10 scale. Patient uses pharmacological and non-pharmacological methods of pain management.         Patient is not progressing towards the following goals:

## 2024-12-13 NOTE — PROGRESS NOTES
12-hour chart check complete.    Monitor Summary  Rhythm: SR  Rate: 60-70's  Ectopy: rPVC  Measurements: 0.18/0.08/0.42

## 2024-12-13 NOTE — DISCHARGE PLANNING
Case Management Discharge Planning    Admission Date: 12/7/2024  GMLOS: 3.3  ALOS: 6    6-Clicks ADL Score: 15  6-Clicks Mobility Score: 10  PT and/or OT Eval ordered: Yes  Post-acute Referrals Ordered: Yes  Post-acute Choice Obtained: Yes  Has referral(s) been sent to post-acute provider:  Yes      Anticipated Discharge Dispo: Discharge Disposition: D/T to SNF with Medicare cert in anticipation of skilled care (03)    PIOTR spoke with [facility] confirmed open bed for today SNF requested 1300 transportation, request sent to [facility] @1230 rec'd call from [facility] pt has a MSP on her Medicare account that needs to be removed before admission. SW assisted pt in calling Medicare to resolve issue. Per Moira at ext [phone] with the Medicare claims recovery Center pts MSP has been removed could take up to 10 business days. This writer spoke to Keagan at [facility]  per Keagan pt can admit today since the process of removing the MSP has been started.

## 2024-12-13 NOTE — DISCHARGE SUMMARY
Discharge Summary    CHIEF COMPLAINT ON ADMISSION  Chief Complaint   Patient presents with    GLF     Pt states that something hit her on the back of her head and she fell down onto her right knee and was unable to get up, pt states this occurred on Wednesday   Per EMS report pts home is in unlivable condition this items everywhere making it difficult for them to get her out of home  Pt states the only pain she has at the moment is in her left rib cage as she felt a pop when she was attempting to get up yesterday         Reason for Admission  EMS    Admission Date  12/7/2024     CODE STATUS  Full Code    HPI & HOSPITAL COURSE  Luis Felipe Hernandez is a 69 y.o. female admitted 12/7/2024 after being down for about 3  days.  She states she was her hallway on 12/4/2024 trying to reach something and was hit in the back of her head by some object.  She fell on the floor and was unable to completely get up.  She denies losing consciousness but states she must of been confused.  Her brother came to her house on 12/6/2024, he did not come in as she was unable to open the door, and she was able to answer his questions through the front door.  Due to unable to reach the patient, the patient's brother called 911 on 12/7/2024.     Found to have significant rhabdomyolysis as well as hypertensive urgency.  CPK greater than 12,000 on admission, currently trending down with IV hydration.  Transaminases also improving and renal function is doing well.    Patient has successfully weaned from IV fluids and on day of discharge her CPK is down to 1800.  She has been off IV fluids for greater than 24 hours and her pain is well-managed with every 6 hour ibuprofen and if it gets severe she does okay with the tramadol 25 mg.  She still has fractured ribs and lower extremity swelling.  She continues to be weak and needs continued therapy to get her back to her independent baseline.  Patient is medically cleared to transition to skilled nursing for  continued therapies and follow-up with her PCP after discharge from [facility].    Therefore, she is discharged in good and stable condition to [facility] nursing facility.    The patient met 2-midnight criteria for an inpatient stay at the time of discharge.      FOLLOW UP ITEMS POST DISCHARGE  PCP-2 weeks    DISCHARGE DIAGNOSES  Principal Problem:    Traumatic rhabdomyolysis (HCC) (POA: Yes)  Active Problems:    Hypertensive urgency (POA: Yes)    Hypernatremia (POA: Yes)    Dehydration (POA: Yes)    Closed fracture of multiple ribs of left side with routine healing (POA: Yes)    Fever (POA: Yes)    Pericardial effusion (POA: Yes)    Elevated troponin (POA: Yes)    Liver enzyme elevation (POA: Yes)    Coronary artery disease involving native coronary artery of native heart without angina pectoris (POA: Yes)    ACP (advance care planning) (POA: Yes)    Breast nodule (POA: Yes)  Resolved Problems:    * No resolved hospital problems. *      FOLLOW UP  No future appointments.  [facility]  [address] [facility] [address]  [phone]          MEDICATIONS ON DISCHARGE     Medication List        START taking these medications        Instructions   amLODIPine 2.5 MG Tabs  Start taking on: December 14, 2024  Commonly known as: Norvasc   Take 1 Tablet by mouth every day.  Dose: 2.5 mg     traMADol HCl 25 MG Tabs   Take 25 mg by mouth every 6 hours as needed for Severe Pain for up to 2 days.  Dose: 25 mg            CHANGE how you take these medications        Instructions   ibuprofen 400 MG Tabs  What changed:   when to take this  reasons to take this  Commonly known as: Motrin   Take 1 Tablet by mouth every 6 hours as needed for Moderate Pain.  Dose: 400 mg            CONTINUE taking these medications        Instructions   Non Formulary Request   Apply 1 Application topically 2 times a day as needed (pain). Freedom Oil  Dose: 1 Application              Allergies  Allergies   Allergen Reactions     Amoxicillin Unspecified     Yeast infection       DIET  Orders Placed This Encounter   Procedures    Diet Order Diet: Regular     Standing Status:   Standing     Number of Occurrences:   1     Order Specific Question:   Diet:     Answer:   Regular [1]       ACTIVITY  As tolerated.  Weight bearing as tolerated    LINES, DRAINS, AND WOUNDS  This is an automated list. Peripheral IVs will be removed prior to discharge.  Peripheral IV 12/09/24 20 G Left;Posterior Forearm (Active)   Site Assessment Clean;Dry;Intact 12/12/24 2000   Dressing Type Transparent 12/12/24 2000   Line Status Saline locked 12/12/24 2000   Dressing Status Clean;Dry;Intact 12/12/24 2000   Dressing Intervention N/A 12/12/24 2000   Dressing Change Due 12/14/24 12/11/24 2020   Date Primary Tubing Changed 12/14/24 12/11/24 0936   NEXT Primary Tubing Change  12/12/24 12/09/24 0444   Infiltration Grading ([facility], [facility]) 0 12/12/24 2000   Phlebitis Scale ([facility] Only) 0 12/12/24 2000       Wound 12/07/24 Abrasion Elbow Posterior Left (Active)   Wound Image   12/09/24 1900   Site Assessment Clean;Dry 12/12/24 2000   Periwound Assessment Clean;Dry;Intact;Pink 12/12/24 0815   Margins Defined edges 12/12/24 0815   Closure Open to air 12/12/24 2000   Drainage Amount None 12/12/24 0815   Drainage Description MISBHA 12/11/24 0936   Treatments Site care 12/10/24 2015   Wound Cleansing Normal Saline Irrigation 12/10/24 2015   Periwound Protectant Not Applicable 12/11/24 0936   Dressing Status Open to Air 12/12/24 2000   Dressing Changed Other (Comment) 12/09/24 1900   Dressing Cleansing/Solutions Not Applicable 12/11/24 0936   Dressing Options Offloading Dressing - Heel 12/09/24 1900   Dressing Change/Treatment Frequency Every 72 hrs, and As Needed 12/11/24 0936   NEXT Dressing Change/Treatment Date 12/10/24 12/09/24 1900   NEXT Weekly Photo (Inpatient Only) 12/18/24 12/09/24 1900   Wound Team Following Not following 12/11/24 0936   Wound Length (cm) 9.5 cm 12/09/24  1900   Wound Width (cm) 3 cm 12/09/24 1900   Wound Depth (cm) 0.2 cm 12/09/24 1900   Wound Surface Area (cm^2) 28.5 cm^2 12/09/24 1900   Wound Volume (cm^3) 5.7 cm^3 12/09/24 1900   Shape irregular 12/09/24 1900       Wound 12/07/24 Pressure Injury Coccyx;Sacrum POA unstageable (Active)   Wound Image   12/09/24 1900   Site Assessment Clean;Dry;Pink;Red 12/12/24 2000   Periwound Assessment Clean;Dry;Intact;Pink 12/12/24 0815   Margins Defined edges 12/12/24 0815   Closure Secondary intention 12/11/24 2020   Drainage Amount Scant 12/12/24 0815   Drainage Description Serosanguineous 12/12/24 0815   Treatments Site care;Offloading 12/12/24 0815   Offloading/DME Other (comment) 12/12/24 0815   Wound Cleansing Foam Cleanser/Washcloth 12/12/24 0815   Periwound Protectant Barrier Paste 12/12/24 2000   Dressing Status Clean;Dry;Intact 12/12/24 0815   Dressing Changed New 12/12/24 0815   Dressing Cleansing/Solutions Not Applicable 12/10/24 1419   Dressing Options Offloading Dressing - Sacral 12/12/24 2000   Dressing Change/Treatment Frequency Every 72 hrs, and As Needed 12/11/24 2020   NEXT Dressing Change/Treatment Date 12/13/24 12/11/24 1010   NEXT Weekly Photo (Inpatient Only) 12/18/24 12/11/24 1010   Wound Team Following Weekly 12/09/24 1900   WOUND NURSE ONLY - Pressure Injury Stage U 12/09/24 1900   Wound Length (cm) 5 cm 12/09/24 1900   Wound Width (cm) 3.5 cm 12/09/24 1900   Wound Surface Area (cm^2) 17.5 cm^2 12/09/24 1900   Shape oval 12/09/24 1900   Wound Odor None 12/09/24 1900       Wound 12/07/24 Skin Tear Back Lower Left (Active)   Wound Image   12/07/24 2100   Site Assessment Clean;Dry;Pink 12/12/24 2000   Periwound Assessment [place] 12/12/24 2000   Margins Defined edges 12/12/24 0815   Closure Open to air 12/12/24 2000   Drainage Amount Scant 12/12/24 0815   Drainage Description Serosanguineous 12/12/24 0815   Treatments Site care 12/12/24 0815   Wound Cleansing Foam Cleanser/Washcloth 12/12/24 0815   Dressing  Status Clean;Dry;Intact 12/12/24 0815   Dressing Changed New 12/12/24 0815   Dressing Options Offloading Dressing - Sacral 12/12/24 0815       Wound 12/07/24 Abrasion Knee Anterior Left (Active)   Wound Image   12/07/24 2100   Site Assessment Clean;Dry 12/12/24 2000   Periwound Assessment Clean;Dry;Intact 12/12/24 0815   Margins Attached edges;Defined edges 12/12/24 0815   Closure Open to air 12/12/24 2000   Drainage Amount None 12/12/24 0815   Treatments Site care 12/12/24 0815   Wound Cleansing Foam Cleanser/Washcloth 12/12/24 0815   Periwound Protectant Not Applicable 12/11/24 0936   Dressing Status Open to Air 12/12/24 0815   Wound Team Following Not following 12/11/24 0936   Non-staged Wound Description Partial thickness 12/09/24 1900   Wound Length (cm) 1.5 cm 12/09/24 1900   Wound Width (cm) 1.3 cm 12/09/24 1900   Wound Surface Area (cm^2) 1.95 cm^2 12/09/24 1900   Shape irregular 12/09/24 1900       Wound 12/07/24 Abrasion Knee Anterior Right (Active)   Wound Image   12/09/24 1900   Site Assessment Clean;Dry 12/12/24 2000   Periwound Assessment Clean;Dry;Intact 12/12/24 0815   Margins Defined edges;Attached edges 12/12/24 0815   Closure Open to air 12/12/24 2000   Drainage Amount None 12/12/24 0815   Treatments Site care 12/12/24 0815   Wound Cleansing Foam Cleanser/Washcloth 12/12/24 0815   Periwound Protectant Not Applicable 12/11/24 0936   Dressing Status Open to Air 12/12/24 0815   Wound Team Following Not following 12/11/24 0936   Non-staged Wound Description Partial thickness 12/09/24 1900   Wound Length (cm) 11 cm 12/09/24 1900   Wound Width (cm) 6 cm 12/09/24 1900   Wound Surface Area (cm^2) 66 cm^2 12/09/24 1900       Wound 12/07/24 Pressure Injury Ankle Dorsal Left POA unstageable (Active)   Wound Image   12/09/24 1900   Site Assessment Clean;Dry;Eschar 12/12/24 2000   Periwound Assessment Clean;Dry;Intact 12/12/24 0815   Margins Attached edges;Defined edges 12/12/24 0815   Closure Open to air  12/12/24 2000   Drainage Amount None 12/11/24 0936   Treatments Site care 12/10/24 0809   Wound Cleansing Foam Cleanser/Washcloth 12/09/24 1900   Dressing Status Open to Air 12/12/24 0815   Wound Team Following Not following 12/11/24 0936   Non-staged Wound Description Full thickness 12/09/24 1900   Wound Length (cm) 2 cm 12/09/24 1900   Wound Width (cm) 0.8 cm 12/09/24 1900   Wound Surface Area (cm^2) 1.6 cm^2 12/09/24 1900   Shape oval 12/09/24 1900   Pulses 2+;DP 12/09/24 1900       Wound 12/07/24 Back Left (Active)   Wound Image   12/07/24 2100   Site Assessment Purple;Intact 12/12/24 2000   Periwound Assessment Clean;Dry;Intact 12/11/24 1010   Margins Defined edges 12/11/24 1010   Closure Open to air 12/12/24 2000   Drainage Amount None 12/11/24 1010   Treatments Site care 12/10/24 0809   Dressing Status Open to Air 12/11/24 1010       Wound 12/07/24 Back Upper Right (Active)   Wound Image   12/07/24 2100   Site Assessment Purple 12/12/24 2000   Periwound Assessment Clean;Dry;Intact;Purple 12/10/24 0809   Closure Open to air 12/10/24 0809   Drainage Amount None 12/10/24 0809   Treatments Site care 12/10/24 0809   Dressing Status Open to Air 12/10/24 0809       Peripheral IV 12/09/24 20 G Left;Posterior Forearm (Active)   Site Assessment Clean;Dry;Intact 12/12/24 2000   Dressing Type Transparent 12/12/24 2000   Line Status Saline locked 12/12/24 2000   Dressing Status Clean;Dry;Intact 12/12/24 2000   Dressing Intervention N/A 12/12/24 2000   Dressing Change Due 12/14/24 12/11/24 2020   Date Primary Tubing Changed 12/14/24 12/11/24 0936   NEXT Primary Tubing Change  12/12/24 12/09/24 0444   Infiltration Grading ([facility]) 0 12/12/24 2000   Phlebitis Scale ([facility] Only) 0 12/12/24 2000               MENTAL STATUS ON TRANSFER             CONSULTATIONS  None    PROCEDURES  None    LABORATORY  Lab Results   Component Value Date    SODIUM 140 12/13/2024    POTASSIUM 3.6 12/13/2024    CHLORIDE 107 12/13/2024    CO2  24 12/13/2024    GLUCOSE 103 (H) 12/13/2024    BUN 13 12/13/2024    CREATININE 0.76 12/13/2024        Lab Results   Component Value Date    WBC 7.6 12/11/2024    HEMOGLOBIN 10.4 (L) 12/11/2024    HEMATOCRIT 33.3 (L) 12/11/2024    PLATELETCT 286 12/11/2024        Total time of the discharge process exceeds 38 minutes.

## 2024-12-13 NOTE — PROGRESS NOTES
Patient was given discharge instructions and signed discharge paperwork. Tele monitor removed and IV removed. Patient was picked by transport team via gurney. Patient's room had all IV fluids removed and linens. Room ready for EVS.

## 2024-12-13 NOTE — PROGRESS NOTES
Telemetry Shift Summary     Rhythm: sr  HR: 67-82  Ectopy: rPVC  Measurements: 0.18/0.08/0.32       Normal Values  Rhythm: SR  HR:   Measurements: 0.12-0.20 / 0.04-0.10 / 0.30-0.52

## 2025-01-15 ENCOUNTER — OFFICE VISIT (OUTPATIENT)
Dept: WOUND CARE | Facility: MEDICAL CENTER | Age: 70
End: 2025-01-15
Attending: STUDENT IN AN ORGANIZED HEALTH CARE EDUCATION/TRAINING PROGRAM
Payer: MEDICARE

## 2025-01-15 DIAGNOSIS — Z91.199 NO-SHOW FOR APPOINTMENT: ICD-10-CM

## 2025-01-15 PROCEDURE — 99999 PR NO CHARGE: CPT | Performed by: STUDENT IN AN ORGANIZED HEALTH CARE EDUCATION/TRAINING PROGRAM

## 2025-01-16 ENCOUNTER — HOSPITAL ENCOUNTER (OUTPATIENT)
Dept: RADIOLOGY | Facility: MEDICAL CENTER | Age: 70
End: 2025-01-16
Attending: NURSE PRACTITIONER
Payer: MEDICARE

## 2025-01-16 ENCOUNTER — OFFICE VISIT (OUTPATIENT)
Dept: WOUND CARE | Facility: MEDICAL CENTER | Age: 70
End: 2025-01-16
Attending: STUDENT IN AN ORGANIZED HEALTH CARE EDUCATION/TRAINING PROGRAM
Payer: MEDICARE

## 2025-01-16 VITALS
DIASTOLIC BLOOD PRESSURE: 72 MMHG | RESPIRATION RATE: 18 BRPM | TEMPERATURE: 98.6 F | SYSTOLIC BLOOD PRESSURE: 130 MMHG | OXYGEN SATURATION: 97 % | HEART RATE: 74 BPM

## 2025-01-16 DIAGNOSIS — S91.002D ANKLE WOUND, LEFT, SUBSEQUENT ENCOUNTER: ICD-10-CM

## 2025-01-16 DIAGNOSIS — T79.6XXD TRAUMATIC RHABDOMYOLYSIS, SUBSEQUENT ENCOUNTER: ICD-10-CM

## 2025-01-16 DIAGNOSIS — L89.524 STAGE IV PRESSURE ULCER OF LEFT ANKLE (HCC): ICD-10-CM

## 2025-01-16 PROCEDURE — 99214 OFFICE O/P EST MOD 30 MIN: CPT | Mod: 25 | Performed by: NURSE PRACTITIONER

## 2025-01-16 PROCEDURE — 3078F DIAST BP <80 MM HG: CPT | Performed by: NURSE PRACTITIONER

## 2025-01-16 PROCEDURE — 73610 X-RAY EXAM OF ANKLE: CPT | Mod: LT

## 2025-01-16 PROCEDURE — 11043 DBRDMT MUSC&/FSCA 1ST 20/<: CPT

## 2025-01-16 PROCEDURE — 3075F SYST BP GE 130 - 139MM HG: CPT | Performed by: NURSE PRACTITIONER

## 2025-01-16 PROCEDURE — 99213 OFFICE O/P EST LOW 20 MIN: CPT

## 2025-01-16 PROCEDURE — 11043 DBRDMT MUSC&/FSCA 1ST 20/<: CPT | Performed by: NURSE PRACTITIONER

## 2025-01-16 ASSESSMENT — ENCOUNTER SYMPTOMS
BACK PAIN: 0
WEAKNESS: 0
NAUSEA: 0
FEVER: 0
VOMITING: 0
DEPRESSION: 0
NERVOUS/ANXIOUS: 0
FALLS: 0
PALPITATIONS: 0
WHEEZING: 0
EYES NEGATIVE: 1
CHILLS: 0
DIAPHORESIS: 0
SHORTNESS OF BREATH: 0
CLAUDICATION: 0
COUGH: 0

## 2025-01-16 NOTE — PATIENT INSTRUCTIONS
-Keep your wound dressing clean, dry, and intact. Change dressing if it's over saturated, soiled or falls off.     -Remove your compression wrap if you have severe pain, severe swelling, numbness, color change, or temperature change in your toes. If you need to remove your compression wrap, do so by unrolling it. Do not cut the compression wrap off to prevent cutting yourself on accident.    -Should you experience any significant changes in your wound(s), such as infection (redness, swelling, localized heat, increased pain, fever > 101 F, chills) or have any questions regarding your home care instructions, please contact the wound center at [phone]. If after hours, contact your primary care physician or go to the hospital emergency room.     If you are admitted to any hospital, you will need a new referral to come back to the wound clinic and any scheduled appointments that you already have, may be cancelled.

## 2025-01-16 NOTE — PROGRESS NOTES
Provider Encounter- Full Thickness wound    HISTORY OF PRESENT ILLNESS  Wound History:    START OF CARE IN CLINIC: 1/16/25    REFERRING PROVIDER: Yovana Gaona MD     WOUND-pressure injury stage IV   LOCATION: L lateral ankle     HISTORY: 12/4/24 fell at home never loss consciousness.  On ground for 3 days until she was found by her brother, EMS contacted and patient was taken to [facility] where patient was admitted from 12/7/24-12/13/24 for rhabdomyolysis.  While admitted she was seen by wound team.  There was an eschar in place to the lateral ankle.  She also had a coccyx injury that is since resolved.  Discharged to [facility] for therapy and wound care. Discharged from [facility] on 1/7/25.   Previous history of left ankle fx with ORIF 1994.  Patient endorses numbness from her previous ankle surgery.  While at SNF she was seen by wound RN who applied honey gel to the wound that was changed every 3 days.  She does ambulate with front wheeled walker.  Currently using clogs that do not come into contact with ankle wound.  Patient reports that at the time of discharge from the SNF, the wound RN noted that patient was beginning to develop granular buds.         Pertinent Medical History: no DM, obesity, HTN     TOBACCO USE:  no    Patient's problem list, allergies, and current medications reviewed and updated in Epic    Interval History:  1/16/25: Initial wound evaluation, initial provider clinic visit with [facility]WS, EVI-BC, CWON, CFRICHARD.  Pt denies fevers, chills, nausea, vomiting.  Currently not on antibiotics, no blood thinners no steroid use.        REVIEW OF SYSTEMS:   Review of Systems   Constitutional:  Negative for chills, diaphoresis and fever.   HENT: Negative.     Eyes: Negative.    Respiratory:  Negative for cough, shortness of breath and wheezing.    Cardiovascular:  Negative for chest pain, palpitations and claudication.   Gastrointestinal:  Negative for nausea  and vomiting.   Musculoskeletal:  Negative for back pain, falls and joint pain.   Skin:  Negative for itching and rash.        Left ankle wound   Neurological:  Positive for sensory change. Negative for weakness.        Tingling to left ankle   Psychiatric/Behavioral:  Negative for depression. The patient is not nervous/anxious.        PHYSICAL EXAMINATION:   /72   Pulse 74   Temp 37 °C (98.6 °F) (Temporal)   Resp 18   SpO2 97%     Physical Exam  Vitals reviewed.   Cardiovascular:      Rate and Rhythm: Normal rate.      Pulses: Normal pulses.      Comments: Left DP, PT +2 palpable  Pulmonary:      Effort: Pulmonary effort is normal.   Musculoskeletal:         General: Swelling present.      Right lower leg: Edema present.      Left lower leg: Edema present.      Comments: Swelling to left ankle, edema +2 nonpitting LLE   Skin:     Comments: Left lateral ankle pressure injury stage IV, full-thickness, circular punched-out, adherent slough, connective tissue, edematous, no erythema   Neurological:      General: No focal deficit present.      Mental Status: She is alert.         WOUND ASSESSMENT  Wound 12/07/24 Pressure Injury Ankle Lateral Left (Active)   Wound Image    01/16/25 1300   Site Assessment Yellow;Red 01/16/25 1300   Periwound Assessment Edema 01/16/25 1300   Margins Unattached edges 01/16/25 1300   Drainage Amount Moderate 01/16/25 1300   Drainage Description Serosanguineous 01/16/25 1300   Treatments Cleansed;Topical Lidocaine;Provider debridement;Site care 01/16/25 1300   Wound Cleansing Hypochlorus Acid 01/16/25 1300   Periwound Protectant No-sting Skin Prep;Skin Moisturizer 01/16/25 1300   Dressing Status Other (Comment) 01/16/25 1300   Dressing Changed New 01/16/25 1300   Dressing Cleansing/Solutions Not Applicable 01/16/25 1300   Dressing Options Honey Gel;Hydrofiber;Compression Wrap Two Layer 01/16/25 1300   Dressing Change/Treatment Frequency Twice Weekly 01/16/25 1300   Wound Team  "Following Weekly 01/16/25 1300   WOUND NURSE ONLY - Pressure Injury Stage 4 01/16/25 1300   Wound Length (cm) 1.8 cm 01/16/25 1300   Wound Width (cm) 1.4 cm 01/16/25 1300   Wound Depth (cm) 0.5 cm 01/16/25 1300   Wound Surface Area (cm^2) 2.52 cm^2 01/16/25 1300   Wound Volume (cm^3) 1.26 cm^3 01/16/25 1300   Post-Procedure Length (cm) 1.8 cm 01/16/25 1300   Post-Procedure Width (cm) 1.6 cm 01/16/25 1300   Post-Procedure Depth (cm) 0.6 cm 01/16/25 1300   Post-Procedure Surface Area (cm^2) 2.88 cm^2 01/16/25 1300   Post-Procedure Volume (cm^3) 1.728 cm^3 01/16/25 1300   Tunneling (cm) 0 cm 01/16/25 1300   Undermining (cm) 0 cm 01/16/25 1300   Wound Odor None 01/16/25 1300   Pulses Left;DP;PT;2+ 01/16/25 1300   Exposed Structures Ligament 01/16/25 1300   Number of days: 43       PROCEDURE: Left lateral ankle  -2% viscous lidocaine applied topically to wound bed for approximately 5 minutes prior to debridement  -Curette used to debride wound bed.  Excisional debridement was performed to remove devitalized tissue until healthy, bleeding tissue was visualized.   Entire surface of wound, 2.88 cm2 debrided.  Tissue debrided into the muscle fascia layer.    -Bleeding controlled with manual pressure.    -Wound care completed by wound RN, refer to flowsheet  -Patient tolerated the procedure well, without c/o pain or discomfort.       Pertinent Labs and Diagnostics:    Labs:     A1c: No results found for: \"HBA1C\"       IMAGING: no, x-ray ankle ordered    VASCULAR STUDIES: no    LAST  WOUND CULTURE:  DATE :   Lab Results   Component Value Date/Time    CULTRSULT  12/08/2024 02:49 AM     No growth after 5 days of incubation.  Blood culture testing and Gram stain, if indicated, are  performed at [facility], [address] [place] [address].  Positive blood cultures are  sent to [facility], [address] [place] [address], for organism identification and  susceptibility testing.   "         ASSESSMENT AND PLAN:     1. Ankle wound, left, subsequent encounter  2. 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  Significant slough, connective tissue noted  - Excisional debridement performed today.  Medically necessary to promote wound healing.  -Patient to follow-up weekly at wound care clinic  - Patient to perform dressing changes 1-2 times in between clinic appointments  - Due to proximity of wound to malleolus, x-ray of left ankle ordered to rule out to osteomyelitis  -Initiate 2 layer compression wrap to control drainage.  Patient to keep wrap dry in between clinic appointments.  Recommend obtaining cast protector to keep wrap dry during showering.  If unable to tolerate wrap, patient to remove and apply Tubigrip which was provided to patient.      Wound care: Honey gel, Hydrofiber, 2 layer compression wrap to reduce edema    3. Traumatic rhabdomyolysis, subsequent encounter  1/16/2025: Complicating factor.  Remaining ulcers throughout body have resolved except for ankle ulcer  - Patient also has a previous history of left ankle fracture with ORIF in 1994.  Currently no hardware exposed.            PATIENT EDUCATION  - Importance of adequate nutrition for wound healing  -Advised to go to ER for any increased redness, swelling, drainage, or odor, or if patient develops fever, chills, nausea or vomiting.     My total time spent caring for the patient on the day of the encounter was 30 minutes.   This does not include time spent on separately billable procedures/tests.       Please note that this note may have been created using voice recognition software. I have worked with technical experts from "[employer] to optimize the interface.  I have made every reasonable attempt to correct obvious errors, but there may be errors of grammar and possibly content that I did not  discover before finalizing the note.    N

## 2025-01-19 ASSESSMENT — ENCOUNTER SYMPTOMS
ROS SKIN COMMENTS: LEFT ANKLE WOUND
SENSORY CHANGE: 1

## 2025-01-21 ENCOUNTER — NON-PROVIDER VISIT (OUTPATIENT)
Dept: WOUND CARE | Facility: MEDICAL CENTER | Age: 70
End: 2025-01-21
Attending: STUDENT IN AN ORGANIZED HEALTH CARE EDUCATION/TRAINING PROGRAM
Payer: MEDICARE

## 2025-01-21 PROCEDURE — 97597 DBRDMT OPN WND 1ST 20 CM/<: CPT

## 2025-01-21 NOTE — PATIENT INSTRUCTIONS
-Keep dressings clean and dry. Change dressings if they become over saturated, soiled or fall off.     -If you need to change your dressings at home: Wash your wound with normal saline, wound cleanser, or unscented soap and water prior to applying your new dressings. Please avoid cleansing with hydrogen peroxide or rubbing alcohol. Hydrogen peroxide and rubbing alcohol are toxic to new tissue and skin cells.    -Avoid prolonged standing or sitting without elevating your legs.    -Remove your compression garments if you have severe pain, severe swelling, numbness, color change, or temperature change in your toes. If you need to remove your compression garments, do so by unrolling them. Do not cut the compression garments off, this is to prevent cutting yourself on accident.    -Should you experience any significant changes in your wound(s), such as signs of infection (increasing redness, swelling, localized heat, increased pain, fever > 101 F, chills) or have any questions regarding your home care instructions, please contact the wound center at [phone]. If after hours, contact your primary care physician or go to the hospital emergency room.     -If you are admitted to any hospital, you will need a new referral to come back to the wound clinic and any scheduled appointments that you already have, may be cancelled.     -If you are 5 or more minutes late for an appointment, we reserve the right to cancel and reschedule that appointment. Additionally, if you are habitually late or not showing (3 late cancellations and/or no shows), we reserve the right to cancel your remaining appointments and it will be your responsibility to obtain a new referral if services are still needed.

## 2025-01-21 NOTE — PROGRESS NOTES
Viscous lidocaine dwell time approximately 5 minutes prior to debridement.   Conservative sharp wound debridement with curette to remove approximately 3cm² of non viable tissue from wound bed. 2 layer compression wrap applied to LLE.

## 2025-01-22 ENCOUNTER — APPOINTMENT (OUTPATIENT)
Dept: WOUND CARE | Facility: MEDICAL CENTER | Age: 70
End: 2025-01-22
Attending: STUDENT IN AN ORGANIZED HEALTH CARE EDUCATION/TRAINING PROGRAM
Payer: MEDICARE

## 2025-01-29 ENCOUNTER — NON-PROVIDER VISIT (OUTPATIENT)
Dept: WOUND CARE | Facility: MEDICAL CENTER | Age: 70
End: 2025-01-29
Attending: STUDENT IN AN ORGANIZED HEALTH CARE EDUCATION/TRAINING PROGRAM
Payer: MEDICARE

## 2025-01-29 PROCEDURE — 97597 DBRDMT OPN WND 1ST 20 CM/<: CPT

## 2025-01-29 NOTE — PATIENT INSTRUCTIONS
- If you need to change your dressings at home: Wash your wound with normal saline, wound cleanser, or unscented soap and water prior to applying your new dressings. Please avoid cleansing with hydrogen peroxide or rubbing alcohol. Hydrogen peroxide and rubbing alcohol are toxic to new tissue and skin cells.    - Remove your compression wrap if you have severe pain, severe swelling, numbness, color change, or temperature change in your toes. If you need to remove your compression wrap, do so by unrolling it. Do not cut the compression wrap off to prevent cutting yourself on accident.    - Should you experience any significant changes in your wound(s), such as infection (redness, swelling, localized heat, increased pain, fever > 101 F, chills) or have any questions regarding your home care instructions, please contact the wound center at [phone]. If after hours, contact your primary care physician or go to the hospital emergency room.     - If you are admitted to any hospital, you will need a new referral to come back to the wound clinic and any scheduled appointments that you already have, may be cancelled.    - If you are 5 or more minutes late for an appointment, we reserve the right to cancel and reschedule that appointment. Additionally, if you are habitually late or not showing (3 late cancellations and/or no shows), we reserve the right to cancel your remaining appointments and it will be your responsibility to obtain a new referral if services are still needed.

## 2025-01-29 NOTE — PROGRESS NOTES
2% viscous lidocaine applied topically to wound bed for approximately 5 minutes prior to debridement. Conservative sharp wound debridement with curette to debride wound bed and periwound of non-viable tissue. Entire surface of wound, < 20 cm2 debrided.      Hypergranular tissue chemically cauterized with silver nitrate and neutralized with normal saline.    2 layer compression wrap applied to left lower extremity. Refer to flow sheet for wound care details. Patient tolerated the procedure well.

## 2025-01-30 ENCOUNTER — APPOINTMENT (OUTPATIENT)
Dept: WOUND CARE | Facility: MEDICAL CENTER | Age: 70
End: 2025-01-30
Attending: STUDENT IN AN ORGANIZED HEALTH CARE EDUCATION/TRAINING PROGRAM
Payer: MEDICARE

## 2025-02-05 ENCOUNTER — NON-PROVIDER VISIT (OUTPATIENT)
Dept: WOUND CARE | Facility: MEDICAL CENTER | Age: 70
End: 2025-02-05
Attending: STUDENT IN AN ORGANIZED HEALTH CARE EDUCATION/TRAINING PROGRAM
Payer: MEDICARE

## 2025-02-05 PROCEDURE — 97597 DBRDMT OPN WND 1ST 20 CM/<: CPT

## 2025-02-05 NOTE — PATIENT INSTRUCTIONS
Avoid prolonged standing or sitting without elevating your legs.  - Apply tubigrip to your legs ending 2 fingers below back of knee without wrinkles.   - Knee-high gradient compression to legs  - Multilayer compression wrap to left leg. Do not get wet and keep on for the week. Only remove if temperature or sensation changes.   If compression needs to be removed, un-wrap it do not cut it off.     Should you experience any significant changes in your wound(s), such as infection (redness, swelling, localized heat, increased pain, fever > 101 F, chills) or have any questions regarding your home care instructions, please contact the wound center at [phone]. If after hours, contact your primary care physician or go to the hospital emergency room.   Keep dressing clean, dry and covered while bathing. Only change dressing if it becomes over saturated, soiled or falls off.

## 2025-02-05 NOTE — PROGRESS NOTES
viscous lidocaine applied topically to wound bed for approximately 5 minutes prior to debridement. Conservative sharp wound debridement with scalpel to debride wound bed of non-viable slough at center of wound <5cm2 after application of topical viscous lidocaine.  Hypergranular tissue chemically cauterized with silver nitrate and neutralized with normal saline flush. Stopped using Medihoney gel due to hypergranulation tissue & too moist wound bed.   2 layer compression wrap applied to left lower extremity.

## 2025-02-12 ENCOUNTER — NON-PROVIDER VISIT (OUTPATIENT)
Dept: WOUND CARE | Facility: MEDICAL CENTER | Age: 70
End: 2025-02-12
Attending: STUDENT IN AN ORGANIZED HEALTH CARE EDUCATION/TRAINING PROGRAM
Payer: MEDICARE

## 2025-02-12 PROCEDURE — 97597 DBRDMT OPN WND 1ST 20 CM/<: CPT

## 2025-02-12 NOTE — PROGRESS NOTES
Viscous lidocaine applied topically to wound bed for approximately 5 minutes prior to debridement. Conservative sharp wound debridement with curette & scalpel to debride wound bed of non-viable slough of <10cm2. Patient tolerated well. Trial use of moistened collagen dressing with Normal Saline to wound depth in center to help promote granulation tissue formation in base of wound and provide a scaffolding matrix. Applied 2 layer compression wrap to left lower extremity.

## 2025-02-12 NOTE — PATIENT INSTRUCTIONS
Avoid prolonged standing or sitting without elevating your legs.  - Multilayer compression wrap to left leg. Do not get wet and keep on for the week. Only remove if temperature or sensation changes.   If compression needs to be removed, un-wrap it do not cut it off.     Should you experience any significant changes in your wound(s), such as infection (redness, swelling, localized heat, increased pain, fever > 101 F, chills) or have any questions regarding your home care instructions, please contact the wound center at [phone]. If after hours, contact your primary care physician or go to the hospital emergency room.   Keep dressing clean, dry and covered while bathing. Only change dressing if it becomes over saturated, soiled or falls off.

## 2025-02-17 ENCOUNTER — NON-PROVIDER VISIT (OUTPATIENT)
Dept: WOUND CARE | Facility: MEDICAL CENTER | Age: 70
End: 2025-02-17
Attending: STUDENT IN AN ORGANIZED HEALTH CARE EDUCATION/TRAINING PROGRAM
Payer: MEDICARE

## 2025-02-17 PROCEDURE — 97597 DBRDMT OPN WND 1ST 20 CM/<: CPT

## 2025-02-17 NOTE — PATIENT INSTRUCTIONS
Avoid prolonged standing or sitting without elevating your legs.  - Apply tubigrip to your legs ending 2 fingers below back of knee without wrinkles.   - Knee-high gradient compression to legs  - Multilayer compression wrap to left leg. Do not get wet and keep on for the week. Only remove if temperature or sensation changes.   If compression needs to be removed, un-wrap it do not cut it off.     Should you experience any significant changes in your wound(s), such as infection (redness, swelling, localized heat, increased pain, fever > 101 F, chills) or have any questions regarding your home care instructions, please contact the wound center at [phone]. If after hours, contact your primary care physician or go to the hospital emergency room.   Keep dressing clean, dry and covered while bathing. Only change dressing if it becomes over saturated, soiled or falls off.

## 2025-02-17 NOTE — PROGRESS NOTES
Topical lidocaine applied to wound bed for approximately 5 minutes prior to debridement. Conservative sharp wound debridement with curette & scalpel to debride wound bed of non-viable slough of <5cm2. Patient tolerated well. Applied 2 layer compression wrap to left lower extremity

## 2025-02-20 ENCOUNTER — APPOINTMENT (OUTPATIENT)
Dept: WOUND CARE | Facility: MEDICAL CENTER | Age: 70
End: 2025-02-20
Attending: STUDENT IN AN ORGANIZED HEALTH CARE EDUCATION/TRAINING PROGRAM
Payer: MEDICARE

## 2025-02-25 ENCOUNTER — OFFICE VISIT (OUTPATIENT)
Dept: WOUND CARE | Facility: MEDICAL CENTER | Age: 70
End: 2025-02-25
Attending: STUDENT IN AN ORGANIZED HEALTH CARE EDUCATION/TRAINING PROGRAM
Payer: MEDICARE

## 2025-02-25 VITALS
TEMPERATURE: 97.1 F | RESPIRATION RATE: 18 BRPM | HEART RATE: 78 BPM | SYSTOLIC BLOOD PRESSURE: 138 MMHG | OXYGEN SATURATION: 97 % | DIASTOLIC BLOOD PRESSURE: 64 MMHG

## 2025-02-25 DIAGNOSIS — S91.002D ANKLE WOUND, LEFT, SUBSEQUENT ENCOUNTER: ICD-10-CM

## 2025-02-25 DIAGNOSIS — L89.524 STAGE IV PRESSURE ULCER OF LEFT ANKLE (HCC): ICD-10-CM

## 2025-02-25 PROCEDURE — 11042 DBRDMT SUBQ TIS 1ST 20SQCM/<: CPT | Performed by: STUDENT IN AN ORGANIZED HEALTH CARE EDUCATION/TRAINING PROGRAM

## 2025-02-25 PROCEDURE — 11042 DBRDMT SUBQ TIS 1ST 20SQCM/<: CPT

## 2025-02-25 PROCEDURE — 3078F DIAST BP <80 MM HG: CPT | Performed by: STUDENT IN AN ORGANIZED HEALTH CARE EDUCATION/TRAINING PROGRAM

## 2025-02-25 PROCEDURE — 3075F SYST BP GE 130 - 139MM HG: CPT | Performed by: STUDENT IN AN ORGANIZED HEALTH CARE EDUCATION/TRAINING PROGRAM

## 2025-02-25 NOTE — PATIENT INSTRUCTIONS
-Keep your wound dressing clean, dry, and intact.     -Remove your compression wrap/garments if you have severe pain, severe swelling, numbness, color change, or temperature change in your toes. Remove wrap if it becomes wet. If you need to remove your compression wrap, do so by unrolling it. Do not cut the compression wrap off to prevent cutting yourself on accident. Do not trim wrap.  Avoid prolonged standing or sitting without elevating your legs.    -Should you experience any significant changes in your wound(s), such as infection (redness, swelling, localized heat, increased pain, fever > 101 F, chills) or have any questions regarding your home care instructions, please contact the wound center at [phone]. If after hours, contact your primary care physician or go to the hospital emergency room.  If you are admitted to any hospital, you will need a new referral to come back to the wound clinic. Any scheduled appointments that you already have may be cancelled.      -If you are 5 or more minutes late for an appointment, we reserve the right to cancel and reschedule that appointment. Additionally, if you are habitually late or not showing (3 late cancellations and/or no shows), we reserve the right to cancel your remaining appointments and it will be your responsibility to obtain a new referral if services are still needed.

## 2025-02-25 NOTE — PROGRESS NOTES
2 layer compression wrap placed to left lower extremity. Patient tolerated well. Refer to flowsheets for further details.

## 2025-02-26 ENCOUNTER — APPOINTMENT (OUTPATIENT)
Dept: WOUND CARE | Facility: MEDICAL CENTER | Age: 70
End: 2025-02-26
Attending: STUDENT IN AN ORGANIZED HEALTH CARE EDUCATION/TRAINING PROGRAM
Payer: MEDICARE

## 2025-02-26 NOTE — PROGRESS NOTES
Provider Encounter- Full Thickness wound    HISTORY OF PRESENT ILLNESS  Wound History:    START OF CARE IN CLINIC: 1/16/25    REFERRING PROVIDER: Yovana Gaona MD     WOUND-pressure injury stage IV   LOCATION: L lateral ankle     HISTORY: 12/4/24 fell at home never loss consciousness.  On ground for 3 days until she was found by her brother, EMS contacted and patient was taken to [facility] where patient was admitted from 12/7/24-12/13/24 for rhabdomyolysis.  While admitted she was seen by wound team.  There was an eschar in place to the lateral ankle.  She also had a coccyx injury that is since resolved.  Discharged to [facility] for therapy and wound care. Discharged from [facility] on 1/7/25.   Previous history of left ankle fx with ORIF 1994.  Patient endorses numbness from her previous ankle surgery.  While at SNF she was seen by wound RN who applied honey gel to the wound that was changed every 3 days.  She does ambulate with front wheeled walker.  Currently using clogs that do not come into contact with ankle wound.  Patient reports that at the time of discharge from the SNF, the wound RN noted that patient was beginning to develop granular buds.         Pertinent Medical History: no DM, obesity, HTN     TOBACCO USE:  no    Patient's problem list, allergies, and current medications reviewed and updated in Epic    Interval History:  1/16/25: Initial wound evaluation, initial provider clinic visit with [facility], [facility], [facility], [facility].  Pt denies fevers, chills, nausea, vomiting.  Currently not on antibiotics, no blood thinners no steroid use.    2/25/2025: Clinic visit with David Romero MD. Patient reports doing well, denies any signs or symptoms of infection. Wound is measuring smaller, much improved. She is tolerating compression, will continue. Briefly discussed future compression options, will need to measure for compression socks at future visit.    REVIEW OF  SYSTEMS:     Unchanged from previous wound clinic assessment on 1/16/2025, except as noted in interval history above      PHYSICAL EXAMINATION:   /64   Pulse 78   Temp 36.2 °C (97.1 °F) (Temporal)   Resp 18   SpO2 97%     Physical Exam  Vitals reviewed.   Cardiovascular:      Rate and Rhythm: Normal rate.      Pulses: Normal pulses.      Comments: Left DP, PT +2 palpable  Pulmonary:      Effort: Pulmonary effort is normal.   Musculoskeletal:         General: Swelling present.      Right lower leg: Edema present.      Left lower leg: Edema present.      Comments: Swelling to left ankle, edema +2 nonpitting LLE   Skin:     Comments: Left lateral ankle pressure injury stage IV: Wound measuring significantly smaller. Dried collagen cap trapping turbid fluid. No evidence of gross infection.   Neurological:      General: No focal deficit present.      Mental Status: She is alert.         WOUND ASSESSMENT  Wound 12/07/24 Pressure Injury Ankle Lateral Left (Active)   Wound Image    02/25/25 1200   Site Assessment Barling 02/25/25 1200   Periwound Assessment Scar tissue;Fragile 02/25/25 1200   Margins Attached edges 02/25/25 1200   Closure Secondary intention 02/17/25 1300   Drainage Amount Small 02/25/25 1200   Drainage Description Serosanguineous 02/25/25 1200   Treatments Cleansed;Topical Lidocaine;Provider debridement;Site care;Compression 02/25/25 1200   Wound Cleansing Hypochlorus Acid 02/25/25 1200   Periwound Protectant No-sting Skin Prep;Skin Moisturizer 02/25/25 1200   Dressing Status Old drainage 02/17/25 1300   Dressing Changed Changed 02/17/25 1300   Dressing Cleansing/Solutions Not Applicable 02/25/25 1200   Dressing Options Hydrofera Blue Ready;Hypafix Tape;Compression Wrap Two Layer 02/25/25 1200   Dressing Change/Treatment Frequency Weekly, and As Needed 02/25/25 1200   Wound Team Following Weekly 02/25/25 1200   WOUND NURSE ONLY - Pressure Injury Stage 4 02/25/25 1200   Non-staged Wound Description  "Full thickness 02/25/25 1200   Wound Length (cm) 0.8 cm 02/17/25 1300   Wound Width (cm) 0.7 cm 02/17/25 1300   Wound Depth (cm) 0.2 cm 02/17/25 1300   Wound Surface Area (cm^2) 0.56 cm^2 02/17/25 1300   Wound Volume (cm^3) 0.112 cm^3 02/17/25 1300   Post-Procedure Length (cm) 0.1 cm 02/25/25 1200   Post-Procedure Width (cm) 0.1 cm 02/25/25 1200   Post-Procedure Depth (cm) 0.1 cm 02/25/25 1200   Post-Procedure Surface Area (cm^2) 0.01 cm^2 02/25/25 1200   Post-Procedure Volume (cm^3) 0.001 cm^3 02/25/25 1200   Wound Healing % 91 02/17/25 1300   Wound Bed Granulation (%) - Post-Procedure 100 % 02/12/25 1300   Tunneling (cm) 0 cm 02/25/25 1200   Undermining (cm) 0 cm 02/25/25 1200   Wound Odor None 02/25/25 1200   Pulses Left;2+;DP;PT 02/17/25 1300   Exposed Structures None 02/25/25 1200   Number of days: 80       PROCEDURE: Excisional debridement of left lateral ankle wound  -2% viscous lidocaine applied topically to wound bed for approximately 5 minutes prior to debridement  -Curette used to debride wound bed.  Excisional debridement was performed to remove devitalized tissue until healthy, bleeding tissue was visualized.   Entire surface of wound, 0.01 cm2 debrided.  Tissue debrided into subcutaneous layer.    -Bleeding controlled with manual pressure.    -Wound care completed by wound RN, refer to flowsheet  -Patient tolerated the procedure well, without c/o pain or discomfort.       Pertinent Labs and Diagnostics:    Labs:     A1c: No results found for: \"HBA1C\"       IMAGING: no, x-ray ankle ordered    VASCULAR STUDIES: no    LAST  WOUND CULTURE:  DATE :   Lab Results   Component Value Date/Time    CULTRSULT  12/08/2024 02:49 AM     No growth after 5 days of incubation.  Blood culture testing and Gram stain, if indicated, are  performed at [facility], [address] [place] [address].  Positive blood cultures are  sent to [facility], [address] [place] [address], " [place] for organism identification and  susceptibility testing.           ASSESSMENT AND PLAN:     1. Ankle wound, left, subsequent encounter  2. 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  - Excisional debridement performed today.  Medically necessary to promote wound healing.  -Patient to follow-up weekly at wound care clinic  - Xray without evidence of OM.  - Tolerating compression, continue 2 layer compression wrap.  - Briefly discussed compression options. Will need counseling and measurement for compression socks at future appointment.  - Hold collagen, formed dried collagen cap over wound bed.    Wound care: Hydrofera Blue Ready, 2 layer compression wrap to reduce edema      PATIENT EDUCATION  - Importance of adequate nutrition for wound healing  -Advised to go to ER for any increased redness, swelling, drainage, or odor, or if patient develops fever, chills, nausea or vomiting.     Please note that this note may have been created using voice recognition software. I have worked with technical experts from [employer] to optimize the interface.  I have made every reasonable attempt to correct obvious errors, but there may be errors of grammar and possibly content that I did not discover before finalizing the note.    N

## 2025-03-05 ENCOUNTER — NON-PROVIDER VISIT (OUTPATIENT)
Dept: WOUND CARE | Facility: MEDICAL CENTER | Age: 70
End: 2025-03-05
Attending: STUDENT IN AN ORGANIZED HEALTH CARE EDUCATION/TRAINING PROGRAM
Payer: MEDICARE

## 2025-03-05 VITALS
OXYGEN SATURATION: 95 % | DIASTOLIC BLOOD PRESSURE: 64 MMHG | RESPIRATION RATE: 16 BRPM | SYSTOLIC BLOOD PRESSURE: 130 MMHG | HEART RATE: 74 BPM | TEMPERATURE: 99 F

## 2025-03-05 PROCEDURE — 99211 OFF/OP EST MAY X REQ PHY/QHP: CPT

## 2025-03-05 NOTE — PATIENT INSTRUCTIONS
Avoid prolonged standing or sitting without elevating your legs.  - Apply tubigrip to your legs ending 2 fingers below back of knee without wrinkles.   - Knee-high gradient compression to legs  If compression needs to be removed, un-wrap it do not cut it off.     Should you experience any significant changes in your wound(s), such as infection (redness, swelling, localized heat, increased pain, fever > 101 F, chills) or have any questions regarding your home care instructions, please contact the wound center at [phone]. If after hours, contact your primary care physician or go to the hospital emergency room.   Apply lotion to left lateral ankle.  Wear compression stockings during the day.  Your stockings were ordered from [employer].

## 2025-03-05 NOTE — CERTIFICATION
[facility]   [facility]   [address]   [address]   [place] [address]   [phone] Fax: [phone]    Discharge Note      Referring Physician: Yovana Gaona MD   Wound Etiology: pressure injury stage IV   Wound location: L lateral ankle     Date of Discharge: 3/5/25    Assessment:  Discharge patient at this time secondary to wound resolution.  Two layer compression stocking ordered from [employer]; patient provided phone number to follow-up.    Thank you for the referral and the opportunity to treat your patient.      Clinician Signature: _____________________________ Date:_______________               show

## 2025-03-12 ENCOUNTER — APPOINTMENT (OUTPATIENT)
Dept: WOUND CARE | Facility: MEDICAL CENTER | Age: 70
End: 2025-03-12
Attending: STUDENT IN AN ORGANIZED HEALTH CARE EDUCATION/TRAINING PROGRAM
Payer: MEDICARE

## 2025-03-19 ENCOUNTER — APPOINTMENT (OUTPATIENT)
Dept: WOUND CARE | Facility: MEDICAL CENTER | Age: 70
End: 2025-03-19
Attending: STUDENT IN AN ORGANIZED HEALTH CARE EDUCATION/TRAINING PROGRAM
Payer: MEDICARE

## 2025-03-26 ENCOUNTER — APPOINTMENT (OUTPATIENT)
Dept: WOUND CARE | Facility: MEDICAL CENTER | Age: 70
End: 2025-03-26
Attending: STUDENT IN AN ORGANIZED HEALTH CARE EDUCATION/TRAINING PROGRAM
Payer: MEDICARE